# Patient Record
Sex: FEMALE | Race: ASIAN | NOT HISPANIC OR LATINO | Employment: PART TIME | URBAN - METROPOLITAN AREA
[De-identification: names, ages, dates, MRNs, and addresses within clinical notes are randomized per-mention and may not be internally consistent; named-entity substitution may affect disease eponyms.]

---

## 2021-05-11 ENCOUNTER — IMMUNIZATION (OUTPATIENT)
Dept: PEDIATRICS | Facility: CLINIC | Age: 31
End: 2021-05-11
Payer: COMMERCIAL

## 2021-05-11 PROCEDURE — 0001A PR COVID VAC PFIZER DIL RECON 30 MCG/0.3 ML IM: CPT

## 2021-05-11 PROCEDURE — 91300 PR COVID VAC PFIZER DIL RECON 30 MCG/0.3 ML IM: CPT

## 2022-01-19 ENCOUNTER — IMMUNIZATION (OUTPATIENT)
Dept: NURSING | Facility: CLINIC | Age: 32
End: 2022-01-19
Attending: INTERNAL MEDICINE
Payer: COMMERCIAL

## 2022-01-19 PROCEDURE — 91305 COVID-19,PF,PFIZER (12+ YRS): CPT

## 2022-01-19 PROCEDURE — 0052A COVID-19,PF,PFIZER (12+ YRS): CPT

## 2022-02-06 ENCOUNTER — HEALTH MAINTENANCE LETTER (OUTPATIENT)
Age: 32
End: 2022-02-06

## 2022-04-13 ENCOUNTER — OFFICE VISIT (OUTPATIENT)
Dept: MIDWIFE SERVICES | Facility: CLINIC | Age: 32
End: 2022-04-13
Payer: COMMERCIAL

## 2022-04-13 VITALS
HEIGHT: 61 IN | BODY MASS INDEX: 24.55 KG/M2 | SYSTOLIC BLOOD PRESSURE: 130 MMHG | HEART RATE: 97 BPM | DIASTOLIC BLOOD PRESSURE: 87 MMHG | WEIGHT: 130 LBS | TEMPERATURE: 96.9 F

## 2022-04-13 DIAGNOSIS — Z12.4 SCREENING FOR CERVICAL CANCER: ICD-10-CM

## 2022-04-13 DIAGNOSIS — Z01.419 ENCOUNTER FOR ANNUAL ROUTINE GYNECOLOGICAL EXAMINATION: Primary | ICD-10-CM

## 2022-04-13 DIAGNOSIS — N92.6 IRREGULAR PERIODS: ICD-10-CM

## 2022-04-13 DIAGNOSIS — N97.9 FEMALE INFERTILITY: ICD-10-CM

## 2022-04-13 DIAGNOSIS — R87.810 ASCUS WITH POSITIVE HIGH RISK HPV CERVICAL: ICD-10-CM

## 2022-04-13 DIAGNOSIS — Z12.4 SCREENING FOR MALIGNANT NEOPLASM OF CERVIX: ICD-10-CM

## 2022-04-13 DIAGNOSIS — R87.610 ASCUS WITH POSITIVE HIGH RISK HPV CERVICAL: ICD-10-CM

## 2022-04-13 LAB
ERYTHROCYTE [DISTWIDTH] IN BLOOD BY AUTOMATED COUNT: 12.1 % (ref 10–15)
HCT VFR BLD AUTO: 46.3 % (ref 35–47)
HGB BLD-MCNC: 15.9 G/DL (ref 11.7–15.7)
MCH RBC QN AUTO: 30.5 PG (ref 26.5–33)
MCHC RBC AUTO-ENTMCNC: 34.3 G/DL (ref 31.5–36.5)
MCV RBC AUTO: 89 FL (ref 78–100)
PLATELET # BLD AUTO: 216 10E3/UL (ref 150–450)
PROLACTIN SERPL-MCNC: 9 UG/L (ref 3–27)
RBC # BLD AUTO: 5.22 10E6/UL (ref 3.8–5.2)
WBC # BLD AUTO: 7.4 10E3/UL (ref 4–11)

## 2022-04-13 PROCEDURE — 88175 CYTOPATH C/V AUTO FLUID REDO: CPT | Performed by: ADVANCED PRACTICE MIDWIFE

## 2022-04-13 PROCEDURE — 85027 COMPLETE CBC AUTOMATED: CPT | Performed by: ADVANCED PRACTICE MIDWIFE

## 2022-04-13 PROCEDURE — 36415 COLL VENOUS BLD VENIPUNCTURE: CPT | Performed by: ADVANCED PRACTICE MIDWIFE

## 2022-04-13 PROCEDURE — 84443 ASSAY THYROID STIM HORMONE: CPT | Performed by: ADVANCED PRACTICE MIDWIFE

## 2022-04-13 PROCEDURE — 87624 HPV HI-RISK TYP POOLED RSLT: CPT | Performed by: ADVANCED PRACTICE MIDWIFE

## 2022-04-13 PROCEDURE — 84146 ASSAY OF PROLACTIN: CPT | Performed by: ADVANCED PRACTICE MIDWIFE

## 2022-04-13 PROCEDURE — 99385 PREV VISIT NEW AGE 18-39: CPT | Performed by: ADVANCED PRACTICE MIDWIFE

## 2022-04-13 PROCEDURE — 82306 VITAMIN D 25 HYDROXY: CPT | Performed by: ADVANCED PRACTICE MIDWIFE

## 2022-04-13 PROCEDURE — 88141 CYTOPATH C/V INTERPRET: CPT | Performed by: PATHOLOGY

## 2022-04-13 ASSESSMENT — PATIENT HEALTH QUESTIONNAIRE - PHQ9: SUM OF ALL RESPONSES TO PHQ QUESTIONS 1-9: 3

## 2022-04-13 NOTE — PROGRESS NOTES
Brando is a 31 year old . female who presents for annual exam.     Menses are irregular, just once or twice a year lasting 5 days.  Menses flow: spotty.  Patient's last menstrual period was 2022.. Using none for contraception.    Besides routine health maintenance,  she would like to discuss irregular periods and fertility.  Brando has been trying to conceive for three years. Over the last year she has tried using ovulation predictor kits. She recently had a positive result on OPK and then had a period about a week later. Otherwise she has periods just once or twice a year, which has been consistent since she started menstruating.   Brando has a history of abnormal Pap smear in 2016, ASCUS with HR HPV. She saw OBGYN for colposcopy and left during the appointment because she felt confused about her Pap result and why she needed a colpo. She did not feel like the provider informed her about what was recommended and she did not feel comfortable having the procedure done. She has not had another Pap since.  GYNECOLOGIC HISTORY:  Menarche: 14  Age at first intercourse: 18 Number of lifetime partners: 2  Brando is sexually active with 1male partner(s) and is currently in monogamous relationship .    History sexually transmitted infections:No STD history  STI testing offered? Declined  MIC exposure: No  History of abnormal Pap smear: Yes, 2016 ASCUS with HR HPV  Family history of breast CA: No  Family history of uterine/ovarian CA: No    Family history of colon CA: No    HEALTH MAINTENANCE:  Cholesterol: (No results found for: CHOL History of abnormal lipids: No  Mammo: no . History of abnormal Mammo: Not applicable.  Regular Self Breast Exams: No  Calcium/Vitamin D intake: source:  dairy, veggies Adequate? Yes  TSH: (No results found for: TSH )  Pap; (No results found for: PAP )    HISTORY:  OB History    Para Term  AB Living   0 0 0 0 0 0   SAB IAB Ectopic Multiple Live Births   0 0 0 0 0     History  "reviewed. No pertinent past medical history.  History reviewed. No pertinent surgical history.  Family History   Problem Relation Age of Onset     Lymphoma Mother      Hypertension Father      Diabetes Paternal Grandmother      Social History     Socioeconomic History     Marital status: Single     Spouse name: None     Number of children: None     Years of education: None     Highest education level: None     No current outpatient medications on file.   No Known Allergies    Past medical, surgical, social and family history were reviewed and updated in EPIC.    ROS:   C:     NEGATIVE for fever, chills, change in weight  I:       NEGATIVE for worrisome rashes, moles or lesions  E:     NEGATIVE for vision changes or irritation  E/M: NEGATIVE for ear, mouth and throat problems  R:     NEGATIVE for significant cough or SOB  CV:   NEGATIVE for chest pain, palpitations or peripheral edema  GI:     NEGATIVE for nausea, abdominal pain, heartburn, or change in bowel habits  :   NEGATIVE for frequency, dysuria, hematuria, vaginal discharge, or irregular bleeding  M:     NEGATIVE for significant arthralgias or myalgia  N:      NEGATIVE for weakness, dizziness or paresthesias  E:      NEGATIVE for temperature intolerance, skin/hair changes  P:      NEGATIVE for changes in mood or affect.    EXAM:  /87   Pulse 97   Temp 96.9  F (36.1  C)   Ht 1.549 m (5' 1\")   Wt 59 kg (130 lb)   LMP 03/30/2022   Breastfeeding No   BMI 24.56 kg/m     BMI: Body mass index is 24.56 kg/m .  Constitutional: healthy, alert and no distress  Head: Normocephalic. No masses, lesions, tenderness or abnormalities  Neck: Neck supple. Trachea midline. No adenopathy. Thyroid symmetric, normal size.   Cardiovascular: RRR.   Respiratory: Negative.   Breast: No nodularity, asymmetry or nipple discharge bilaterally.  Gastrointestinal: Abdomen soft, non-tender, non-distended. No masses, organomegaly.  :  Vulva:  No external lesions, normal female " hair distribution, no inguinal adenopathy.    Urethra:  Midline, non-tender, well supported, no discharge  Vagina:  Moist, pink, no abnormal discharge, no lesions, cervix is nulliparous, smooth, no lesions, negative CMT, Pap smear collected  Ovaries:  No masses appreciated, non-tender, mobile  Rectal Exam: deferred  Musculoskeletal: extremities normal  Skin: no suspicious lesions or rashes  Psychiatric: Affect appropriate, cooperative,mentation appears normal.     COUNSELING:    reports that she has never smoked. She has never used smokeless tobacco.  Body mass index is 24.56 kg/m .      ASSESSMENT:  31 year old female with satisfactory annual exam  (Z01.419) Encounter for annual routine gynecological examination  (primary encounter diagnosis)    (N92.6) Irregular periods  Comment: Discussed potential causes of irregular periods including PCOS, thyroid disorders, elevated prolactin, premature ovarian insufficiency. Plan for labs and transvaginal US.  Plan: CBC with platelets, TSH with free T4 reflex,         Prolactin, Vitamin D Deficiency, US         Transvaginal Pelvic Non-OB    (R87.610,  R87.810) ASCUS with positive high risk HPV cervical  Comment: Long discussion about cervical cancer screening including Pap smears, speculum exams, and colposcopy. Plan for diagnostic Pap today and then colpo if needed. Patient is fearful of colposcopy and interested in anti-anxiety medication to premedicate procedure.  Plan: Pap imaged thin layer screen with HPV -         recommended age 30 - 65 years (select HPV order        below)    (Z12.4) Screening for cervical cancer  Plan: Pap diagnostic with HPV    (N97.9) Female infertility  Comment: Discussed irregular periods and hypothesis that she is not ovulating regularly. Plan for workup discussed above and then referral to OBGYN for either further assessment or for fertility treatment.    Deneen Manzano, CHEN CONNORS

## 2022-04-14 LAB
DEPRECATED CALCIDIOL+CALCIFEROL SERPL-MC: 11 UG/L (ref 20–75)
TSH SERPL DL<=0.005 MIU/L-ACNC: 1.57 MU/L (ref 0.4–4)

## 2022-04-18 LAB
BKR LAB AP GYN ADEQUACY: ABNORMAL
BKR LAB AP GYN INTERPRETATION: ABNORMAL
BKR LAB AP HPV REFLEX: ABNORMAL
BKR LAB AP LMP: ABNORMAL
BKR LAB AP PREVIOUS ABNL DX: ABNORMAL
BKR LAB AP PREVIOUS ABNORMAL: ABNORMAL
PATH REPORT.COMMENTS IMP SPEC: ABNORMAL
PATH REPORT.COMMENTS IMP SPEC: ABNORMAL
PATH REPORT.RELEVANT HX SPEC: ABNORMAL

## 2022-04-19 LAB
HUMAN PAPILLOMA VIRUS 16 DNA: NEGATIVE
HUMAN PAPILLOMA VIRUS 18 DNA: NEGATIVE
HUMAN PAPILLOMA VIRUS FINAL DIAGNOSIS: NORMAL
HUMAN PAPILLOMA VIRUS OTHER HR: NEGATIVE

## 2022-04-20 ENCOUNTER — PATIENT OUTREACH (OUTPATIENT)
Dept: FAMILY MEDICINE | Facility: CLINIC | Age: 32
End: 2022-04-20
Payer: COMMERCIAL

## 2022-04-20 DIAGNOSIS — R87.810 ASCUS WITH POSITIVE HIGH RISK HPV CERVICAL: ICD-10-CM

## 2022-04-20 DIAGNOSIS — R87.610 ASCUS WITH POSITIVE HIGH RISK HPV CERVICAL: ICD-10-CM

## 2022-08-22 ENCOUNTER — TELEPHONE (OUTPATIENT)
Dept: ORTHOPEDICS | Facility: CLINIC | Age: 32
End: 2022-08-22

## 2022-10-02 ENCOUNTER — HEALTH MAINTENANCE LETTER (OUTPATIENT)
Age: 32
End: 2022-10-02

## 2022-10-25 NOTE — TELEPHONE ENCOUNTER
FYI to provider - Patient is lost to pap tracking follow-up. Attempts to contact pt have been made per reminder process and there has been no reply and/or no appt scheduled. Contact hx listed below.     7/29/15 ASCUS pap, + HR HPV. 2016, did not have colposcopy  4/13/22 ASCUS pap, neg HR HPV. Plan: colp bef 7/13/22 4/21/22 left msg and sent my chart result note. Seen by patient on 4/21/2022  9:53 PM  6/13/22 Reminder MyChart / read  7/11/22 [place] not done. Tracking updated for 6 mo colp/pap due 10/13/22.   9/26/22 Reminder MyChart / MyChart marked as read by the patient  10/25/22 Lost to follow-up for pap tracking

## 2023-05-04 ENCOUNTER — OFFICE VISIT (OUTPATIENT)
Dept: FAMILY MEDICINE | Facility: CLINIC | Age: 33
End: 2023-05-04
Payer: COMMERCIAL

## 2023-05-04 VITALS
WEIGHT: 135.4 LBS | TEMPERATURE: 97.4 F | HEART RATE: 79 BPM | SYSTOLIC BLOOD PRESSURE: 131 MMHG | OXYGEN SATURATION: 97 % | BODY MASS INDEX: 25.57 KG/M2 | DIASTOLIC BLOOD PRESSURE: 96 MMHG | HEIGHT: 61 IN

## 2023-05-04 DIAGNOSIS — R87.810 ASCUS WITH POSITIVE HIGH RISK HPV CERVICAL: ICD-10-CM

## 2023-05-04 DIAGNOSIS — Z11.59 NEED FOR HEPATITIS C SCREENING TEST: ICD-10-CM

## 2023-05-04 DIAGNOSIS — Z11.4 SCREENING FOR HIV (HUMAN IMMUNODEFICIENCY VIRUS): ICD-10-CM

## 2023-05-04 DIAGNOSIS — N92.6 IRREGULAR PERIODS: Primary | ICD-10-CM

## 2023-05-04 DIAGNOSIS — R87.610 ASCUS WITH POSITIVE HIGH RISK HPV CERVICAL: ICD-10-CM

## 2023-05-04 PROCEDURE — 99204 OFFICE O/P NEW MOD 45 MIN: CPT

## 2023-05-04 NOTE — PATIENT INSTRUCTIONS
Schedule lab visit - morning labs, do not eat after midnight the night before     Schedule pelvic ultrasound - [facility]    Schedule with Deneen RIVAS  - follow up on PAP from last year    Follow up with me in 3-6 months, recheck blood pressure.   Try low salt diet

## 2023-05-04 NOTE — PROGRESS NOTES
"  Assessment & Plan     Irregular periods  Chronic, unchanged. Unclear DDx, includes diabetes, PCOS, fibroids, endometriosis, polyps, premature menopause. Patient has not completed prior workup. Spent most of the visit providing education about cervical cancer, pap smear, and  Strongly encouraged patient to schedule pelvic US (order from 2022 midwife visit not completed) and schedule follow up with OB.   - US Pelvic Complete with Transvaginal  - Vitamin D Deficiency  - Basic metabolic panel  (Ca, Cl, CO2, Creat, Gluc, K, Na, BUN)  - CBC with platelets and differential  - TSH with free T4 reflex    ASCUS with positive high risk HPV cervical  Recurrent. Discussed  PAP and HPV result history and rational for cervical cancer screening protocols, strongly encouraged pt to see OBGYN. Advised they may want to repeat pap this year vs proceed with colposcpy.     Need for hepatitis C screening test  - Hepatitis C Screen Reflex to HCV RNA Quant and Genotype    Screening for HIV (human immunodeficiency virus)  - HIV Antigen Antibody Combo      Ordering of each unique test         BMI:   Estimated body mass index is 25.58 kg/m  as calculated from the following:    Height as of this encounter: 1.549 m (5' 1\").    Weight as of this encounter: 61.4 kg (135 lb 6.4 oz).       See Patient Instructions    CHEN Goodwin CNP  [facility]    Humberto Michaels is a 32 year old, presenting for the following health issues:  Abnormal Bleeding Problem (Discuss infertility )        5/4/2023    12:53 PM   Additional Questions   Roomed by Chika   Accompanied by none         5/4/2023    12:53 PM   Patient Reported Additional Medications   Patient reports taking the following new medications none     Abnormal Bleeding Problem    History of Present Illness       Reason for visit:  Infertility    She eats 2-3 servings of fruits and vegetables daily.She consumes 1 sweetened beverage(s) daily.She exercises with enough " "effort to increase her heart rate 30 to 60 minutes per day.  She exercises with enough effort to increase her heart rate 3 or less days per week.   She is taking medications regularly.     Last period in November 2022, lasted 3 weeks and heavier than normal.   Irregular periods since teen. Periods usually light, no severe cramping. No prior birth control. No prior pregnancies. No prior STI.     Trying to get pregnant since 2019, was tracking ovulation with strips, has had positive results every few months.        Wt Readings from Last 5 Encounters:   05/04/23 61.4 kg (135 lb 6.4 oz)   04/13/22 59 kg (130 lb)       Denies change in appetite/wt, denies constipation/diarrhea, skin/hair changes. Denies pain with intercourse, pelvic pain, fainting, dizziness, or fevers .    Saw OBGYN last year for same concerns.     Chart shows history of abnormal PAP, recommended colp and pt was lost to follow up. Repeated PAP in 2020 and again lost to follow up.     7/29/15 ASCUS pap, + HR HPV. 2016, did not have colposcopy    4/13/22 ASCUS pap, neg HR HPV. Plan: colp bef 7/13/22 4/21/22 left msg and sent my chart result note. Seen by patient on 4/21/2022  9:53 PM  6/13/22 Reminder MyChart / read  7/11/22 [place] not done. Tracking updated for 6 mo colp/pap due 10/13/22.   9/26/22 Reminder MyChart / MyChart marked as read by the patient  10/25/22 Lost to follow-up for pap tracking    Pt reports she was advised to schedule something but she did not schedule due to confusion and has not followed up.       Review of Systems   Genitourinary: Positive for menstrual problem.      Constitutional, HEENT, cardiovascular, pulmonary, gi and gu systems are negative, except as otherwise noted.      Objective    BP (!) 131/96 (BP Location: Right arm, Patient Position: Sitting, Cuff Size: Adult Regular)   Pulse 79   Temp 97.4  F (36.3  C) (Tympanic)   Ht 1.549 m (5' 1\")   Wt 61.4 kg (135 lb 6.4 oz)   LMP 11/09/2022 (Exact Date)   SpO2 97%   " Breastfeeding No   BMI 25.58 kg/m    Body mass index is 25.58 kg/m .  Physical Exam   GENERAL: healthy, alert and no distress  NECK: no adenopathy, no asymmetry, masses, or scars and thyroid normal to palpation  RESP: lungs clear to auscultation - no rales, rhonchi or wheezes  CV: regular rate and rhythm, normal S1 S2, no S3 or S4, no murmur, click or rub, no peripheral edema and peripheral pulses strong  ABDOMEN: soft, nontender, no hepatosplenomegaly, no masses and bowel sounds normal  SKIN: no suspicious lesions or rashes    No results found for this or any previous visit (from the past 24 hour(s)).

## 2023-05-20 ENCOUNTER — HEALTH MAINTENANCE LETTER (OUTPATIENT)
Age: 33
End: 2023-05-20

## 2023-06-22 ENCOUNTER — HOSPITAL ENCOUNTER (OUTPATIENT)
Dept: ULTRASOUND IMAGING | Facility: CLINIC | Age: 33
Discharge: HOME | End: 2023-06-22
Payer: COMMERCIAL

## 2023-06-22 ENCOUNTER — OFFICE VISIT (OUTPATIENT)
Dept: FAMILY MEDICINE | Facility: CLINIC | Age: 33
End: 2023-06-22
Payer: COMMERCIAL

## 2023-06-22 ENCOUNTER — TELEPHONE (OUTPATIENT)
Dept: FAMILY MEDICINE | Facility: CLINIC | Age: 33
End: 2023-06-22

## 2023-06-22 VITALS
DIASTOLIC BLOOD PRESSURE: 94 MMHG | TEMPERATURE: 97.3 F | RESPIRATION RATE: 16 BRPM | WEIGHT: 138 LBS | HEIGHT: 61 IN | OXYGEN SATURATION: 98 % | HEART RATE: 74 BPM | BODY MASS INDEX: 26.06 KG/M2 | SYSTOLIC BLOOD PRESSURE: 126 MMHG

## 2023-06-22 DIAGNOSIS — N92.6 IRREGULAR PERIODS: ICD-10-CM

## 2023-06-22 DIAGNOSIS — M72.2 PLANTAR FASCIITIS: ICD-10-CM

## 2023-06-22 DIAGNOSIS — L25.9 CONTACT DERMATITIS, UNSPECIFIED CONTACT DERMATITIS TYPE, UNSPECIFIED TRIGGER: Primary | ICD-10-CM

## 2023-06-22 LAB
ANION GAP SERPL CALCULATED.3IONS-SCNC: 7 MMOL/L (ref 7–15)
BASOPHILS # BLD AUTO: 0 10E3/UL (ref 0–0.2)
BASOPHILS NFR BLD AUTO: 0 %
BUN SERPL-MCNC: 13.2 MG/DL (ref 6–20)
CALCIUM SERPL-MCNC: 9.2 MG/DL (ref 8.6–10)
CHLORIDE SERPL-SCNC: 104 MMOL/L (ref 98–107)
CREAT SERPL-MCNC: 0.61 MG/DL (ref 0.51–0.95)
DEPRECATED CALCIDIOL+CALCIFEROL SERPL-MC: 13 UG/L (ref 20–75)
DEPRECATED HCO3 PLAS-SCNC: 25 MMOL/L (ref 22–29)
EOSINOPHIL # BLD AUTO: 0.1 10E3/UL (ref 0–0.7)
EOSINOPHIL NFR BLD AUTO: 1 %
ERYTHROCYTE [DISTWIDTH] IN BLOOD BY AUTOMATED COUNT: 11.5 % (ref 10–15)
GFR SERPL CREATININE-BSD FRML MDRD: >90 ML/MIN/1.73M2
GLUCOSE SERPL-MCNC: 103 MG/DL (ref 70–99)
HCT VFR BLD AUTO: 43.5 % (ref 35–47)
HCV AB SERPL QL IA: NONREACTIVE
HGB BLD-MCNC: 14.6 G/DL (ref 11.7–15.7)
HIV 1+2 AB+HIV1 P24 AG SERPL QL IA: NONREACTIVE
IMM GRANULOCYTES # BLD: 0 10E3/UL
IMM GRANULOCYTES NFR BLD: 0 %
LYMPHOCYTES # BLD AUTO: 2.9 10E3/UL (ref 0.8–5.3)
LYMPHOCYTES NFR BLD AUTO: 35 %
MCH RBC QN AUTO: 30 PG (ref 26.5–33)
MCHC RBC AUTO-ENTMCNC: 33.6 G/DL (ref 31.5–36.5)
MCV RBC AUTO: 89 FL (ref 78–100)
MONOCYTES # BLD AUTO: 0.4 10E3/UL (ref 0–1.3)
MONOCYTES NFR BLD AUTO: 4 %
NEUTROPHILS # BLD AUTO: 4.7 10E3/UL (ref 1.6–8.3)
NEUTROPHILS NFR BLD AUTO: 58 %
PLATELET # BLD AUTO: 204 10E3/UL (ref 150–450)
POTASSIUM SERPL-SCNC: 4.6 MMOL/L (ref 3.4–5.3)
RBC # BLD AUTO: 4.87 10E6/UL (ref 3.8–5.2)
SODIUM SERPL-SCNC: 136 MMOL/L (ref 136–145)
TSH SERPL DL<=0.005 MIU/L-ACNC: 1.96 UIU/ML (ref 0.3–4.2)
WBC # BLD AUTO: 8.1 10E3/UL (ref 4–11)

## 2023-06-22 PROCEDURE — 85025 COMPLETE CBC W/AUTO DIFF WBC: CPT | Performed by: FAMILY MEDICINE

## 2023-06-22 PROCEDURE — 99214 OFFICE O/P EST MOD 30 MIN: CPT | Performed by: FAMILY MEDICINE

## 2023-06-22 PROCEDURE — 84443 ASSAY THYROID STIM HORMONE: CPT | Performed by: FAMILY MEDICINE

## 2023-06-22 PROCEDURE — 80048 BASIC METABOLIC PNL TOTAL CA: CPT | Performed by: FAMILY MEDICINE

## 2023-06-22 PROCEDURE — 76830 TRANSVAGINAL US NON-OB: CPT

## 2023-06-22 PROCEDURE — 87389 HIV-1 AG W/HIV-1&-2 AB AG IA: CPT | Performed by: FAMILY MEDICINE

## 2023-06-22 PROCEDURE — 36415 COLL VENOUS BLD VENIPUNCTURE: CPT | Performed by: FAMILY MEDICINE

## 2023-06-22 PROCEDURE — 86803 HEPATITIS C AB TEST: CPT | Performed by: FAMILY MEDICINE

## 2023-06-22 PROCEDURE — 82306 VITAMIN D 25 HYDROXY: CPT | Performed by: FAMILY MEDICINE

## 2023-06-22 RX ORDER — KETOCONAZOLE 20 MG/G
CREAM TOPICAL DAILY
Qty: 45 G | Refills: 0 | Status: SHIPPED | OUTPATIENT
Start: 2023-06-22 | End: 2024-02-23

## 2023-06-22 RX ORDER — TRIAMCINOLONE ACETONIDE 1 MG/G
OINTMENT TOPICAL 2 TIMES DAILY
Qty: 45 G | Refills: 0 | Status: SHIPPED | OUTPATIENT
Start: 2023-06-22 | End: 2024-02-23

## 2023-06-22 ASSESSMENT — ENCOUNTER SYMPTOMS
ALLERGIC/IMMUNOLOGIC NEGATIVE: 1
CONSTITUTIONAL NEGATIVE: 1
EYES NEGATIVE: 1
CARDIOVASCULAR NEGATIVE: 1
PSYCHIATRIC NEGATIVE: 1
HEMATOLOGIC/LYMPHATIC NEGATIVE: 1
GASTROINTESTINAL NEGATIVE: 1
RESPIRATORY NEGATIVE: 1
ENDOCRINE NEGATIVE: 1

## 2023-06-22 ASSESSMENT — PAIN SCALES - GENERAL: PAINLEVEL: SEVERE PAIN (7)

## 2023-06-22 NOTE — TELEPHONE ENCOUNTER
Patient calling regarding visit today.     States Dr. Barfield was going to order custom foot insoles for plantar fascitis.    No order present in chart.     Please order/advise     [phone] okay to leave a detailed message.    Isha Muhammad RN on 6/22/2023 at 11:45 AM

## 2023-06-22 NOTE — PROGRESS NOTES
"  Assessment & Plan     (L25.9) Contact dermatitis, unspecified contact dermatitis type, unspecified trigger  (primary encounter diagnosis)  Comment: Rash appears yeats like. Recommend nizoral and steroid cream, if no improvement recommend derm appointment  Plan: triamcinolone (KENALOG) 0.1 % external         ointment, ketoconazole (NIZORAL) 2 % external         cream         (M72.2) Plantar fasciitis  Comment: DME order for orthotics, needs to make appointment.  Plan: [facility] Orthotic; Foot         Orthotics; Bilateral            (N92.6) Irregular periods  Comment: Patient has had this issue for a long time. Work up has not been completed. Encouraged that she complete her work up.  Plan: As above    Review of external notes as documented elsewhere in note  30 minutes spent by me on the date of the encounter doing chart review, interpretation of tests, patient visit and documentation        BMI:   Estimated body mass index is 26.51 kg/m  as calculated from the following:    Height as of this encounter: 1.537 m (5' 0.5\").    Weight as of this encounter: 62.6 kg (138 lb).       FUTURE APPOINTMENTS:       - Follow-up visit as needed.    Jazlyn Barfield MD  [facility]    Humberto Michaels is a 32 year old, presenting for the following health issues:    32 yr old female here for several complaints.     Rash  Patient comes in with a rash on both her nipples. Rash is scaly and itchy, she says it has been there for about a month. She did not change soaps or detergents.    Patient reports that she has had some clear discharge from the nipples. There is mild pain. No warmth. She is not nursing and she reports that she is not on any birth control.    Foot pain  Ongoing for months.pain is on the plantar surface. Pain on first step in the morning. 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 Job becoming more difficult.    Irregular periods  She says this has " been ongoing for years . It has no pattern or rhythm. Review of her chart shows she has been seen for this several times but has failed to complete work up including labs and ultrasound. Highly encouraged that she gets her labs and ultrasound done.       Breast Problem (Has had itchiness of both breasts for over 1 month.  She thought this may have been related to dry skin and has used lotion but her symptoms are still present.  Thought she may be pregnant and did home tests that were negative.  The last negative test was 3 weeks ago.  There is some breast pain.  Her LMP was in November as her cycle is irregular.), Foot Pain (Bilateral foot pain.  This has been for the past month.  She noticed after a run where the great toe area on the left side was swollen.  When standing she can notice the pain and can't stand for longer periods of time. ), Blood Draw (Patient is fasting today.  She has future orders from another Provider to complete.), and Imm/Inj (She will update her tetanus injection at a later time.)        6/22/2023     7:55 AM   Additional Questions   Roomed by Starr Perez CMA     Imm/Inj    History of Present Illness       Reason for visit:  Itchy breasts and foot pain  Symptom onset:  More than a month  Symptoms include:  Extreme itchiness and painful feet  Symptom intensity:  Moderate  Symptom progression:  Worsening  Had these symptoms before:  No  What makes it worse:  Running  What makes it better:  Sade consumes 1 sweetened beverage(s) daily.She exercises with enough effort to increase her heart rate 9 or less minutes per day.  She exercises with enough effort to increase her heart rate 3 or less days per week.   She is taking medications regularly.       Chief Complaint   Patient presents with     Breast Problem     Has had itchiness of both breasts for over 1 month.  She thought this may have been related to dry skin and has used lotion but her symptoms are still present.  Thought she may be  pregnant and did home tests that were negative.  The last negative test was 3 weeks ago.  There is some breast pain.  Her LMP was in November as her cycle is irregular.     Foot Pain     Bilateral foot pain.  This has been for the past month.  She noticed after a run where the great toe area on the left side was swollen.  When standing she can notice the pain and can't stand for longer periods of time.      Blood Draw     Patient is fasting today.  She has future orders from another Provider to complete.     Imm/Inj     She will update her tetanus injection at a later time.       Breast Concern  Onset/Duration: For over one month  Description:   Location: Around both breasts.  Pain or tenderness: YES  Redness:  no   Intensity: moderate, severe  Progression of Symptoms: worsening  Accompanying Signs & Symptoms:  Any lumps in axillary region:  no   Movable: N/A  Nipple discharge: yes, her bra will have discharge.  Changes in the skin or nipple: May have been a pimple.  On Hormone therapy:  no   Does it change with menstrual cycle:  no   Previous history of similar problem: None  First degree relative with breast cancer: a negative family history for breast cancer.  Precipitating factors:           Worsened by: Since it has been hotter outside.  Alleviating factors:            Improved by: None  Therapies tried and outcome: None  Patient's last menstrual period was 11/09/2022 (exact date).          Review of Systems   Constitutional: Negative.    HENT: Negative.    Eyes: Negative.    Respiratory: Negative.    Cardiovascular: Negative.    Gastrointestinal: Negative.    Endocrine: Negative.    Breasts:  Positive for tenderness and discharge.   Genitourinary: Positive for menstrual problem.   Musculoskeletal: Positive for gait problem.        Jesus foot pain   Allergic/Immunologic: Negative.    Hematological: Negative.    Psychiatric/Behavioral: Negative.             Objective    BP (!) 126/94   Pulse 74   Temp 97.3  F  "(36.3  C) (Tympanic)   Resp 16   Ht 1.537 m (5' 0.5\")   Wt 62.6 kg (138 lb)   LMP 11/09/2022 (Exact Date)   SpO2 98%   BMI 26.51 kg/m    Body mass index is 26.51 kg/m .  Physical Exam  Constitutional:       Appearance: Normal appearance.   HENT:      Head: Normocephalic and atraumatic.   Cardiovascular:      Rate and Rhythm: Normal rate and regular rhythm.      Pulses: Normal pulses.      Heart sounds: Normal heart sounds.   Pulmonary:      Effort: Pulmonary effort is normal.      Breath sounds: Normal breath sounds.   Chest:   Breasts:     Right: Nipple discharge and skin change present. No swelling.      Left: Nipple discharge and skin change present.      Comments: Crusty scaly rash on both nipples. Appears like a yeasty fungal rash.  Abdominal:      General: Abdomen is flat. Bowel sounds are normal.      Palpations: Abdomen is soft.   Skin:     Findings: Erythema and rash present.   Neurological:      Mental Status: She is alert and oriented to person, place, and time.   Psychiatric:         Mood and Affect: Mood normal.         Behavior: Behavior normal.            Results for orders placed or performed in visit on 06/22/23 (from the past 24 hour(s))   TSH with free T4 reflex   Result Value Ref Range    TSH 1.96 0.30 - 4.20 uIU/mL   CBC with platelets and differential    Narrative    The following orders were created for panel order CBC with platelets and differential.  Procedure                               Abnormality         Status                     ---------                               -----------         ------                     CBC with platelets and d...[[id number]]                      Final result                 Please view results for these tests on the individual orders.   Basic metabolic panel  (Ca, Cl, CO2, Creat, Gluc, K, Na, BUN)   Result Value Ref Range    Sodium 136 136 - 145 mmol/L    Potassium 4.6 3.4 - 5.3 mmol/L    Chloride 104 98 - 107 mmol/L    Carbon Dioxide (CO2) 25 22 - 29 " mmol/L    Anion Gap 7 7 - 15 mmol/L    Urea Nitrogen 13.2 6.0 - 20.0 mg/dL    Creatinine 0.61 0.51 - 0.95 mg/dL    Calcium 9.2 8.6 - 10.0 mg/dL    Glucose 103 (H) 70 - 99 mg/dL    GFR Estimate >90 >60 mL/min/1.73m2   Vitamin D Deficiency   Result Value Ref Range    Vitamin D, Total (25-Hydroxy) 13 (L) 20 - 75 ug/L    Narrative    Season, race, dietary intake, and treatment affect the concentration of 25-hydroxy-Vitamin D. Values may decrease during winter months and increase during summer months. Values 20-29 ug/L may indicate Vitamin D insufficiency and values <20 ug/L may indicate Vitamin D deficiency.    Vitamin D determination is routinely performed by an immunoassay specific for 25 hydroxyvitamin D3.  If an individual is on vitamin D2(ergocalciferol) supplementation, please specify 25 OH vitamin D2 and D3 level determination by LCMSMS test VITD23.     Hepatitis C Screen Reflex to HCV RNA Quant and Genotype   Result Value Ref Range    Hepatitis C Antibody Nonreactive Nonreactive    Narrative    Assay performance characteristics have not been established for newborns, infants, and children.   HIV Antigen Antibody Combo   Result Value Ref Range    HIV Antigen Antibody Combo Nonreactive Nonreactive   CBC with platelets and differential   Result Value Ref Range    WBC Count 8.1 4.0 - 11.0 10e3/uL    RBC Count 4.87 3.80 - 5.20 10e6/uL    Hemoglobin 14.6 11.7 - 15.7 g/dL    Hematocrit 43.5 35.0 - 47.0 %    MCV 89 78 - 100 fL    MCH 30.0 26.5 - 33.0 pg    MCHC 33.6 31.5 - 36.5 g/dL    RDW 11.5 10.0 - 15.0 %    Platelet Count 204 150 - 450 10e3/uL    % Neutrophils 58 %    % Lymphocytes 35 %    % Monocytes 4 %    % Eosinophils 1 %    % Basophils 0 %    % Immature Granulocytes 0 %    Absolute Neutrophils 4.7 1.6 - 8.3 10e3/uL    Absolute Lymphocytes 2.9 0.8 - 5.3 10e3/uL    Absolute Monocytes 0.4 0.0 - 1.3 10e3/uL    Absolute Eosinophils 0.1 0.0 - 0.7 10e3/uL    Absolute Basophils 0.0 0.0 - 0.2 10e3/uL    Absolute  Immature Granulocytes 0.0 <=0.4 10e3/uL

## 2023-06-23 NOTE — TELEPHONE ENCOUNTER
DME order placed during visit, patient needs to call to schedule an appt with orthotics department.

## 2023-06-26 ENCOUNTER — OFFICE VISIT (OUTPATIENT)
Dept: MIDWIFE SERVICES | Facility: CLINIC | Age: 33
End: 2023-06-26
Payer: COMMERCIAL

## 2023-06-26 VITALS
OXYGEN SATURATION: 100 % | DIASTOLIC BLOOD PRESSURE: 94 MMHG | WEIGHT: 138 LBS | HEART RATE: 97 BPM | BODY MASS INDEX: 26.51 KG/M2 | SYSTOLIC BLOOD PRESSURE: 129 MMHG

## 2023-06-26 DIAGNOSIS — N97.9 FEMALE INFERTILITY: Primary | ICD-10-CM

## 2023-06-26 DIAGNOSIS — E55.9 VITAMIN D DEFICIENCY: ICD-10-CM

## 2023-06-26 PROCEDURE — 99214 OFFICE O/P EST MOD 30 MIN: CPT | Performed by: ADVANCED PRACTICE MIDWIFE

## 2023-06-26 RX ORDER — CHOLECALCIFEROL (VITAMIN D3) 50 MCG
1 TABLET ORAL DAILY
Qty: 90 TABLET | Refills: 3 | Status: SHIPPED | OUTPATIENT
Start: 2023-06-26 | End: 2024-02-23

## 2023-06-26 NOTE — PROGRESS NOTES
S:  Brando is here with her  Sha to discuss trying to conceive. Brando first established care with CN in April 2022 for annual exam and began fertility workup at that time. She has been trying to conceive for 4 years. She has periods just one or twice a year. Periods last 5 days and have moderate bleeding and cramping.    In April of last year, lab work was started to rule out PCOS, thyroid disorders, prolactin abnormalities. She was found to have low vitamin D but other labs were normal. Plan was made with patient to get TVUS and then return to clinic for other fertility labs. Brando had a gap in care and did not return to clinic, as she hoped that her period would regulate on it's own.     She recently had TVUS ordered by a different provider and completed this week. US shows endometrial hyperplasia, Nabothian cyst on cervix and small uterine fibroid.     In the past year, Brando has only had one period, in November 2022.    O:  BP (!) 129/94   Pulse 97   Wt 62.6 kg (138 lb)   LMP 11/09/2022 (Exact Date)   SpO2 100%   BMI 26.51 kg/m       Transvaginal ultrasound 6/22/23:  UTERUS: 7.5 x 4.3 x 3.1 cm. Likely intramural fibroid in the posterior  aspect of the uterine fundus measuring up to 1.5 cm. Nabothian cyst in  the cervix.     ENDOMETRIUM: 14 mm. Thickened, heterogeneous endometrium with some  subtle internal cystic change.     RIGHT OVARY: 2.9 x 2.6 x 1.9 cm. Normal with flow demonstrated.     LEFT OVARY: 3.5 x 2.4 x 1.7 cm. Normal with flow demonstrated.    A/P:  (N97.9) Female infertility  (primary encounter diagnosis)  Comment: Plan for AMH today and then estradiol and FSH on day 3 of next period. Establish care with OBGYN for additional assessment, including assessment of endometrial hyperplasia on ultrasound. Suspect hyperplasia is due to infrequent menses, but could assess further with endometrial biopsy, per OBGYN discretion. Discussed I can also refer her directly to local fertility clinic, but  she would like to see OBGYN on our team first.  Plan: Anti-Mullerian hormone, Estradiol, Follicle         stimulating hormone    (E55.9) Vitamin D deficiency  Comment: Discussed vitamin D deficiency and recommendation for supplement.  Plan: vitamin D3 (CHOLECALCIFEROL) 50 mcg (2000         units) tablet      CHEN Chávez CNM

## 2023-06-27 NOTE — TELEPHONE ENCOUNTER
Left detailed VM for patient (states okay below) and provided number for podiatry.    Noy Caceres RN  [facility]

## 2023-06-28 ENCOUNTER — LAB (OUTPATIENT)
Dept: LAB | Facility: CLINIC | Age: 33
End: 2023-06-28
Payer: COMMERCIAL

## 2023-06-28 DIAGNOSIS — N97.9 FEMALE INFERTILITY: ICD-10-CM

## 2023-06-28 LAB
FSH SERPL IRP2-ACNC: 6 MIU/ML
MIS SERPL-MCNC: 7.26 NG/ML (ref 0.58–8.1)

## 2023-06-28 PROCEDURE — 83520 IMMUNOASSAY QUANT NOS NONAB: CPT

## 2023-06-28 PROCEDURE — 36415 COLL VENOUS BLD VENIPUNCTURE: CPT

## 2023-06-28 PROCEDURE — 83001 ASSAY OF GONADOTROPIN (FSH): CPT

## 2023-06-29 ENCOUNTER — LAB (OUTPATIENT)
Dept: LAB | Facility: CLINIC | Age: 33
End: 2023-06-29
Payer: COMMERCIAL

## 2023-06-29 DIAGNOSIS — N97.9 FEMALE INFERTILITY: ICD-10-CM

## 2023-06-29 LAB — ESTRADIOL SERPL-MCNC: 22 PG/ML

## 2023-06-29 PROCEDURE — 36415 COLL VENOUS BLD VENIPUNCTURE: CPT

## 2023-06-29 PROCEDURE — 82670 ASSAY OF TOTAL ESTRADIOL: CPT

## 2023-07-07 ENCOUNTER — OFFICE VISIT (OUTPATIENT)
Dept: OBGYN | Facility: CLINIC | Age: 33
End: 2023-07-07
Payer: COMMERCIAL

## 2023-07-07 VITALS
SYSTOLIC BLOOD PRESSURE: 123 MMHG | TEMPERATURE: 97.9 F | HEART RATE: 85 BPM | DIASTOLIC BLOOD PRESSURE: 87 MMHG | BODY MASS INDEX: 26.16 KG/M2 | WEIGHT: 136.2 LBS

## 2023-07-07 DIAGNOSIS — R87.810 ASCUS WITH POSITIVE HIGH RISK HPV CERVICAL: ICD-10-CM

## 2023-07-07 DIAGNOSIS — E28.2 PCOS (POLYCYSTIC OVARIAN SYNDROME): ICD-10-CM

## 2023-07-07 DIAGNOSIS — R87.610 ASCUS WITH POSITIVE HIGH RISK HPV CERVICAL: ICD-10-CM

## 2023-07-07 DIAGNOSIS — R93.89 THICKENED ENDOMETRIUM: Primary | ICD-10-CM

## 2023-07-07 PROCEDURE — 99204 OFFICE O/P NEW MOD 45 MIN: CPT | Performed by: OBSTETRICS & GYNECOLOGY

## 2023-07-07 NOTE — PATIENT INSTRUCTIONS
"Instructions for Letrozole    1. Have a period  - Take a pregnancy test, and report results to Dr. Terrazas. She will then order letrozole.  - Provera for 10 days (if you are not having periods on your own)  - You will get a period after the provera  - Your first of bleeding (real bleeding, not just spotting) is \"Day One\"  - on the first cycle we do an xray of the fallopian tubes (HSG). Call on day one to schedule: [phone]. It will be done cycle days 7-10.    2. Ovulate  - Take letrozole on days 3-7 (day 1 is first day of bleeding)    3. Check for ovulation  - Start day 10 for 10-14 days. Ovulation predictor kits helps us know when in cycle you ovulate. Inform Dr. Terrazas of when you had positive OPK.  - Come in to the lab for a blood draw on day 21 (progesterone), or when indicated by Dr. Terrazas based on OPK result.. If elevated, this confirms ovulation.    4. [place]  - Start day 11  - EVERY OTHER DAY to ensure a high concentration of sperm. DAILY for 2-3 days when ovulation kit is positive.    5. Check for pregnancy  - If you ovulated, you'll either get a period, or you'll be pregnant.  - If you don't get a period, take a pregnancy test two weeks after your progesterone level (day 35)    Send me a [employer] message or call with any questions.      "

## 2023-07-07 NOTE — PROGRESS NOTES
Assessment & Plan     Thickened endometrium  Reviewed ultrasound report and images.  Follicles do appear to be small and peripheral on right ovary.  Discussed markedly thickened endometrium.  Ultrasound was notably done a few days prior to her recent period.   Discussed that prolonged anovulation can lead to thickened endometrium which may be a sign of endometrial hyperplasia.  Discussed hyperplasia as a potentially precancerous lesion.  Recommend evaluation with endometrial biopsy.  Recommend performing biopsy today but patient request to reschedule for a different day.      ASCUS with positive high risk HPV cervical  Discussed Pap smear history.  Due for Pap smear.  Recommend performing at time of endometrial biopsy at her earliest convenience.    PCOS (polycystic ovarian syndrome)  Discussed PCOS.  Meets criteria by oligo ovulation and ultrasound appearance of ovaries.  Anti-müllerian hormone was mildly elevated.  Testosterone has not been assessed.  - Testosterone Free and Total  - Discussed concerns related to PCOS   1. Metabolic syndrome  Abdominal obesity, defined as a waist circumference ?88 cm (35 in) in women. Not present  Serum triglycerides ?150 mg/dL (1.7 mmol/L) or drug treatment for elevated triglycerides not assessed  Serum high-density lipoprotein (HDL) cholesterol <50 mg/dL (1.3 mmol/L) in women not assessed  Blood pressure ?130/85 mmHg or drug treatment for elevated blood pressure not present  Fasting plasma glucose (FPG) ?100 mg/dL (5.6 mmol/L) or drug treatment for elevated blood glucose    2. Cosmetic   - facial hair, no complaints   - acne, not present   - hair loss over crown, not present   - obesity not present     3. Endometrial cancer    - discussed risk if chronic anovulation    - needs period at least every 3 months, plan ovulation induction after endometrial biopsy.     4. Fertility  - discussed recommendation for ovulation induction  - Discussed ovulation induction with clomid versus  "letrozole  - Discussed lower risk of multiples based on preliminary data with letrozole  - Discussed off label use of letrozole, versus FDA approval for clomid. Discussed some patients with PCOS have improved fertility rates with letrozole, others with clomid. Discussed anti-estrogenic effect of clomid. Given thin endometrial stripe on prior ultrasound, letrozole may be better option  - Discussed side effects of letrozole, primarily headache, dizziness, fatigue.   - Reviewed written letrozole instructions, will communicate by MyChart.  -  - plan semen analysis and plan HSG with first cycle.  - Will start ovulation induction cycle with spontaneous menses or provera after endometrial biopsy  - Taking PNV.            Review of the result(s) of each unique test - labs, ultrasound  Ordering of each unique test         BMI:   Estimated body mass index is 26.16 kg/m  as calculated from the following:    Height as of 6/22/23: 1.537 m (5' 0.5\").    Weight as of this encounter: 61.8 kg (136 lb 3.2 oz).   Weight management plan: Discussed healthy diet and exercise guidelines        No follow-ups on file.    Chana Terrazas MD  [facility]    Humberto Michaels is a 33 year old, presenting for the following health issues:  Follow Up (Fertility/EMB.)    HPI   Presents for evaluation of infertility  Patient's last menstrual period was 06/26/2023.   Normal period. Lasted 7 days.   Periods prior to this have been 1-2 times per year.   Has been trying to conceive for four years.   Here with her . No proven paternity.     Past Medical History:   Diagnosis Date     PCOS (polycystic ovarian syndrome)        No past surgical history on file.    Family History   Problem Relation Age of Onset     Lymphoma Mother      Hypertension Father      Diabetes Paternal Grandmother        Social History     Socioeconomic History     Marital status: Single     Spouse name: Not on file     Number of " children: Not on file     Years of education: Not on file     Highest education level: Not on file   Occupational History     Not on file   Tobacco Use     Smoking status: Never     Passive exposure: Never     Smokeless tobacco: Never   Vaping Use     Vaping Use: Never used   Substance and Sexual Activity     Alcohol use: Yes     Drug use: Never     Sexual activity: Yes     Partners: Male     Birth control/protection: None   Other Topics Concern     Not on file   Social History Narrative     Not on file     Social Determinants of Health     Financial Resource Strain: Not on file   Food Insecurity: Not on file   Transportation Needs: Not on file   Physical Activity: Not on file   Stress: Not on file   Social Connections: Not on file   Intimate Partner Violence: Not on file   Housing Stability: Not on file       Current Outpatient Medications   Medication     ketoconazole (NIZORAL) 2 % external cream     triamcinolone (KENALOG) 0.1 % external ointment     vitamin D3 (CHOLECALCIFEROL) 50 mcg (2000 units) tablet     No current facility-administered medications for this visit.        No Known Allergies        Review of Systems   Constitutional, HEENT, cardiovascular, pulmonary, gi and gu systems are negative, except as otherwise noted.      Objective    /87   Pulse 85   Temp 97.9  F (36.6  C) (Temporal)   Wt 61.8 kg (136 lb 3.2 oz)   LMP 06/26/2023   Breastfeeding No   BMI 26.16 kg/m    Body mass index is 26.16 kg/m .  Physical Exam   GENERAL: healthy, alert and no distress  MS: no gross musculoskeletal defects noted, no edema  PSYCH: mentation appears normal, affect normal/bright      Component      Latest Ref Rng 6/22/2023  9:15 AM 6/28/2023  7:38 AM   TSH      0.30 - 4.20 uIU/mL 1.96     Vitamin D Deficiency screening      20 - 75 ug/L 13 (L)     Hepatitis C Antibody      Nonreactive  Nonreactive     HIV Antigen Antibody Combo      Nonreactive  Nonreactive     Anti-Mullerian Hormone      0.580 - 8.100  ng/mL  7.260    FSH      mIU/mL  6.0    Estradiol      pg/mL       Component      Latest Ref Rng 6/29/2023  9:43 AM   TSH      0.30 - 4.20 uIU/mL    Vitamin D Deficiency screening      20 - 75 ug/L    Hepatitis C Antibody      Nonreactive     HIV Antigen Antibody Combo      Nonreactive     Anti-Mullerian Hormone      0.580 - 8.100 ng/mL    FSH      mIU/mL    Estradiol      pg/mL 22       Legend:  (L) Low    Component      Latest Ref Rng 6/22/2023  9:15 AM   Sodium      136 - 145 mmol/L 136    Potassium      3.4 - 5.3 mmol/L 4.6    Chloride      98 - 107 mmol/L 104    Carbon Dioxide (CO2)      22 - 29 mmol/L 25    Anion Gap      7 - 15 mmol/L 7    Urea Nitrogen      6.0 - 20.0 mg/dL 13.2    Creatinine      0.51 - 0.95 mg/dL 0.61    Calcium      8.6 - 10.0 mg/dL 9.2    Glucose      70 - 99 mg/dL 103 (H)    GFR Estimate      >60 mL/min/1.73m2 >90       Legend:  (H) High      US PELVIC TRANSABDOMINAL AND TRANSVAGINAL 6/22/2023 4:06 PM     CLINICAL HISTORY: Irregular periods  TECHNIQUE: Transabdominal scans were performed. Endovaginal ultrasound  was performed to better visualize the adnexa.     COMPARISON: None.     FINDINGS:     UTERUS: 7.5 x 4.3 x 3.1 cm. Likely intramural fibroid in the posterior  aspect of the uterine fundus measuring up to 1.5 cm. Nabothian cyst in  the cervix.     ENDOMETRIUM: 14 mm. Thickened, heterogeneous endometrium with some  subtle internal cystic change.     RIGHT OVARY: 2.9 x 2.6 x 1.9 cm. Normal with flow demonstrated.     LEFT OVARY: 3.5 x 2.4 x 1.7 cm. Normal with flow demonstrated.     No significant free fluid.                                                                      IMPRESSION:  1.  Thickened, heterogeneous endometrium, of uncertain clinical  significance in this premenopausal patient, differential includes  hyperplasia. Could consider sonographic follow-up or sampling.  2.  Likely small intramural uterine fibroid.     ERI TRACY MD

## 2023-08-16 ENCOUNTER — TELEPHONE (OUTPATIENT)
Dept: FAMILY MEDICINE | Facility: CLINIC | Age: 33
End: 2023-08-16
Payer: COMMERCIAL

## 2023-08-16 DIAGNOSIS — E55.9 VITAMIN D DEFICIENCY: Primary | ICD-10-CM

## 2023-08-16 RX ORDER — ERGOCALCIFEROL 1.25 MG/1
50000 CAPSULE, LIQUID FILLED ORAL WEEKLY
Qty: 8 CAPSULE | Refills: 0 | Status: SHIPPED | OUTPATIENT
Start: 2023-08-16 | End: 2023-10-05

## 2023-08-16 NOTE — TELEPHONE ENCOUNTER
Called pt. ANJEL 8/16/23 at 10:50.     Called to check in on patients plan for follow up regarding her prior pelvic ultrasound findings.   Would like to encourage her to schedule the endometrial biopsy recommended by Dr. Terrazas.     She does not need an appointment with me just wanted to see if there was anything else I could help with or questions I can answer.    Wanted to let pt know I think she's in great hands getting procedure done with Dr. Terrazas. I do think it's important to get this completed as it can rule out some serious conditions and/or help up come with recommendations to treat her symptoms.     Thanks

## 2023-10-20 ENCOUNTER — TELEPHONE (OUTPATIENT)
Dept: OBGYN | Facility: CLINIC | Age: 33
End: 2023-10-20
Payer: COMMERCIAL

## 2023-10-20 DIAGNOSIS — N93.8 DUB (DYSFUNCTIONAL UTERINE BLEEDING): Primary | ICD-10-CM

## 2023-10-20 NOTE — LETTER
October 24, 2023      Brando Antony  [address] [address]        Dear Brando,  We have been trying to reach you by phone to get you scheduled for a couple appointments. At this time, it is recommended that you get a repeat ultrasound and plan to have a follow up visit after with me to discuss. Please call the clinic at [phone] to get these appointments scheduled at your earliest convenience.           Sincerely,    Chana Terrazas MD

## 2023-10-20 NOTE — TELEPHONE ENCOUNTER
"LVM for patient to call back.    Held US time on 10/23 for 4:00 pm US  Held 11:15 slot on AO schedule for follow up phone visit.  Offer these to patient - if doesn't work for patient then we can unhold these slots.    EVANGELISTA Adams Amanda Elizabeth, MD Martinetto, Cora S, RN  Whatever the patient is willing to do. If ultrasound with phone visit is best that's fine.  On call day is fine    She can go on an est prenatal spot that's fine. She could be double booked with a 30 min on a not too busy day.    Whatever works!      Vidya Bolanos RN Olson, Amanda Elizabeth, MD; [facility]  Hi Dr. Terrazas,  Actually to clarify, did you want an US with a follow up phone visit to discuss then plan to schedule an EMB later?  That makes more sense now that I think about it... Help my Friday brain :)    Thanks!          Previous Messages       ----- Message -----  From: Vidya Bolanos RN  Sent: 10/20/2023  11:29 AM CDT  To: Chana Terrazas MD; [facility] Triage    Hi!  We are happy to reach out to this patient. Just was taking a peek at the schedule and noticed that you do not have any clinic time available until 12/1/23 and that would be over an \"Established Prenatal\" slot. Did you have a specific spot where you would like her in you schedule, and on call day, or does early December work time nicholas?    Thanks!  Vidya  ----- Message -----  From: Chana Terrazas MD  Sent: 10/20/2023  10:21 AM CDT  To: [facility]    Please call patient.  She needs repeat ultrasound and possibly endometrial biopsy.  She has been nervous to schedule.    See if we can help her schedule ultrasound and appointment. We can discuss biopsy then.    Answer questions and see if she wants to talk to me.    Thank you!  ----- Message -----  From: Chana Terrzaas MD  Sent: 10/19/2023  12:00 AM CDT  To: Chana Terrazas MD    Check on US, biopsy   "

## 2023-10-23 NOTE — TELEPHONE ENCOUNTER
Second attempt at reaching patient.  LVM that we would like to get her scheduled for a couple appointments and to please call back.  Unheld appt times.    Vidya Bolanos RN

## 2023-10-24 NOTE — TELEPHONE ENCOUNTER
Third attempt at reaching patient.  LVM for patient to call back.  Letter sent to patient home address on file regarding scheduling information.    Vidya Bolanos RN

## 2024-02-09 ENCOUNTER — ANCILLARY PROCEDURE (OUTPATIENT)
Dept: ULTRASOUND IMAGING | Facility: CLINIC | Age: 34
End: 2024-02-09
Attending: OBSTETRICS & GYNECOLOGY
Payer: COMMERCIAL

## 2024-02-09 DIAGNOSIS — N93.8 DUB (DYSFUNCTIONAL UTERINE BLEEDING): ICD-10-CM

## 2024-02-09 PROCEDURE — 76856 US EXAM PELVIC COMPLETE: CPT | Performed by: OBSTETRICS & GYNECOLOGY

## 2024-02-09 PROCEDURE — 76830 TRANSVAGINAL US NON-OB: CPT | Performed by: OBSTETRICS & GYNECOLOGY

## 2024-02-19 ENCOUNTER — TELEPHONE (OUTPATIENT)
Dept: OBGYN | Facility: CLINIC | Age: 34
End: 2024-02-19
Payer: COMMERCIAL

## 2024-02-19 PROBLEM — R93.89 THICKENED ENDOMETRIUM: Status: ACTIVE | Noted: 2024-02-10

## 2024-02-19 NOTE — TELEPHONE ENCOUNTER
Type of surgery: gyn  Location of surgery: [facility]  Date and time of surgery: 03/19/2024 9:15AM  Surgeon: Dr. Chana Terrazas  Pre-Op Appt Date: 02/23/2024  Post-Op Appt Date: 04/15/2024   Packet sent out: Yes  Pre-cert/Authorization completed:  Not Applicable  Date: 02/19/2024

## 2024-02-23 ENCOUNTER — OFFICE VISIT (OUTPATIENT)
Dept: FAMILY MEDICINE | Facility: CLINIC | Age: 34
End: 2024-02-23
Payer: COMMERCIAL

## 2024-02-23 VITALS
TEMPERATURE: 98 F | OXYGEN SATURATION: 97 % | BODY MASS INDEX: 25.72 KG/M2 | DIASTOLIC BLOOD PRESSURE: 87 MMHG | RESPIRATION RATE: 22 BRPM | SYSTOLIC BLOOD PRESSURE: 123 MMHG | HEART RATE: 68 BPM | WEIGHT: 131 LBS | HEIGHT: 60 IN

## 2024-02-23 DIAGNOSIS — R93.89 THICKENED ENDOMETRIUM: ICD-10-CM

## 2024-02-23 DIAGNOSIS — Z01.818 PREOP GENERAL PHYSICAL EXAM: Primary | ICD-10-CM

## 2024-02-23 DIAGNOSIS — E28.2 PCOS (POLYCYSTIC OVARIAN SYNDROME): ICD-10-CM

## 2024-02-23 DIAGNOSIS — J01.10 ACUTE NON-RECURRENT FRONTAL SINUSITIS: ICD-10-CM

## 2024-02-23 DIAGNOSIS — R05.2 SUBACUTE COUGH: ICD-10-CM

## 2024-02-23 DIAGNOSIS — E55.9 VITAMIN D DEFICIENCY: ICD-10-CM

## 2024-02-23 PROCEDURE — 99214 OFFICE O/P EST MOD 30 MIN: CPT | Performed by: FAMILY MEDICINE

## 2024-02-23 RX ORDER — BENZONATATE 200 MG/1
200 CAPSULE ORAL 3 TIMES DAILY PRN
Qty: 21 CAPSULE | Refills: 0 | Status: SHIPPED | OUTPATIENT
Start: 2024-02-23 | End: 2024-03-01

## 2024-02-23 RX ORDER — CHOLECALCIFEROL (VITAMIN D3) 50 MCG
1 TABLET ORAL DAILY
Qty: 90 TABLET | Refills: 3 | Status: SHIPPED | OUTPATIENT
Start: 2024-02-23

## 2024-02-23 RX ORDER — AZITHROMYCIN 250 MG/1
TABLET, FILM COATED ORAL
Qty: 6 TABLET | Refills: 0 | Status: SHIPPED | OUTPATIENT
Start: 2024-02-23 | End: 2024-02-28

## 2024-02-23 ASSESSMENT — PAIN SCALES - GENERAL: PAINLEVEL: NO PAIN (0)

## 2024-02-23 NOTE — PROGRESS NOTES
Preoperative Evaluation  [address] [address]  Phone: [phone]  Primary Provider: No Ref-Primary, Physician  Pre-op Performing Provider: NUZHAT DIMAS  Feb 23, 2024       Brando is a 33 year old, presenting for the following:  Pre-Op Exam        2/23/2024     9:33 AM   Additional Questions   Roomed by Sofía KRAMER CMA   Accompanied by N/A         2/23/2024     9:33 AM   Patient Reported Additional Medications   Patient reports taking the following new medications None     Surgical Information  Surgery/Procedure: MORCELLATION, HYSTEROSCOPIC   Surgery Location: [facility]  Surgeon: Dr. Terrazas  Surgery Date: 03/19/24  Time of Surgery: 9:20  Where patient plans to recover: At home with family  Fax number for surgical facility: Note does not need to be faxed, will be available electronically in Epic.    Assessment & Plan     The proposed surgical procedure is considered INTERMEDIATE risk.    Preop general physical exam  Nothing to eat or drink,  after midnight the morning of surgery.  No NSAID one week before surgery    PCOS (polycystic ovarian syndrome)  Thickened endometrium  Surgery,  Morcellation, Hysteroscopic, PAP    Vitamin D deficiency    - vitamin D3 (CHOLECALCIFEROL) 50 mcg (2000 units) tablet; Take 1 tablet (50 mcg) by mouth daily    Acute non-recurrent frontal sinusitis    - azithromycin (ZITHROMAX) 250 MG tablet; Take 2 tablets (500 mg) by mouth daily for 1 day, THEN 1 tablet (250 mg) daily for 4 days.    Subacute cough    - benzonatate (TESSALON) 200 MG capsule; Take 1 capsule (200 mg) by mouth 3 times daily as needed for cough       - No identified additional risk factors other than previously addressed    Antiplatelet or Anticoagulation Medication Instructions   - Patient is on no antiplatelet or anticoagulation medications.    Additional Medication Instructions  Patient is on no additional chronic  medications    Recommendation  APPROVAL GIVEN to proceed with proposed procedure, without further diagnostic evaluation.    Subjective       HPI related to upcoming procedure:     Patient with history of polycystic ovarian syndrome, thickened endometrium.  She is scheduled to have a biopsy.  She has no heart disease, no history of asthma, no history of bleeding or blood transfusion.    She has been having minor cough, postnasal drainage.  She has no fever no chills, no short of breath.        2/23/2024     9:21 AM   Preop Questions   1. Have you ever had a heart attack or stroke? No   2. Have you ever had surgery on your heart or blood vessels, such as a stent placement, a coronary artery bypass, or surgery on an artery in your head, neck, heart, or legs? No   3. Do you have chest pain with activity? No   4. Do you have a history of  heart failure? No   5. Do you currently have a cold, bronchitis or symptoms of other infection? No   6. Do you have a cough, shortness of breath, or wheezing? No   7. Do you or anyone in your family have previous history of blood clots? No   8. Do you or does anyone in your family have a serious bleeding problem such as prolonged bleeding following surgeries or cuts? No   9. Have you ever had problems with anemia or been told to take iron pills? No   10. Have you had any abnormal blood loss such as black, tarry or bloody stools, or abnormal vaginal bleeding? No   11. Have you ever had a blood transfusion? No   12. Are you willing to have a blood transfusion if it is medically needed before, during, or after your surgery? NO -    13. Have you or any of your relatives ever had problems with anesthesia? No   14. Do you have sleep apnea, excessive snoring or daytime drowsiness? No   15. Do you have any artifical heart valves or other implanted medical devices like a pacemaker, defibrillator, or continuous glucose monitor? No   16. Do you have artificial joints? No   17. Are you allergic to  latex? No   18. Is there any chance that you may be pregnant? No       Health Care Directive  Patient does not have a Health Care Directive or Living Will:     Preoperative Review of    reviewed - no record of controlled substances prescribed.      Status of Chronic Conditions:  See problem list for active medical problems.  Problems all longstanding and stable, except as noted/documented.  See ROS for pertinent symptoms related to these conditions.    Patient Active Problem List    Diagnosis Date Noted    Thickened endometrium 02/10/2024     Priority: Medium    Vitamin D deficiency 08/16/2023     Priority: Medium    ASCUS with positive high risk HPV cervical 04/13/2022     Priority: Medium     7/29/15 ASCUS pap, + HR HPV. 2016, did not have colposcopy  4/13/22 ASCUS pap, neg HR HPV. Plan: colp bef 7/13/22 4/21/22 left msg and sent my chart result note. Seen by patient on 4/21/2022  9:53 PM  6/13/22 Reminder MyChart / read  7/11/22 [place] not done. Tracking updated for 6 mo colp/pap due 10/13/22.   9/26/22 Reminder MyChart / MyChart marked as read by the patient  10/25/22 Lost to follow-up for pap tracking          Past Medical History:   Diagnosis Date    PCOS (polycystic ovarian syndrome)      History reviewed. No pertinent surgical history.  Current Outpatient Medications   Medication Sig Dispense Refill    ketoconazole (NIZORAL) 2 % external cream Apply topically daily (Patient not taking: Reported on 2/23/2024) 45 g 0    triamcinolone (KENALOG) 0.1 % external ointment Apply topically 2 times daily (Patient not taking: Reported on 2/23/2024) 45 g 0    vitamin D3 (CHOLECALCIFEROL) 50 mcg (2000 units) tablet Take 1 tablet (50 mcg) by mouth daily (Patient not taking: Reported on 2/23/2024) 90 tablet 3       No Known Allergies     Social History     Tobacco Use    Smoking status: Never     Passive exposure: Never    Smokeless tobacco: Never   Substance Use Topics    Alcohol use: Yes     Family History   Problem  "Relation Age of Onset    Lymphoma Mother     Hypertension Father     Diabetes Paternal Grandmother      History   Drug Use Unknown         Review of Systems    Review of Systems  Constitutional, HEENT, cardiovascular, pulmonary, GI, , musculoskeletal, neuro, skin, endocrine and psych systems are negative, except as otherwise noted.    Objective    /87 (BP Location: Right arm, Patient Position: Chair, Cuff Size: Adult Regular)   Pulse 68   Temp 98  F (36.7  C) (Oral)   Resp 22   Ht 1.533 m (5' 0.34\")   Wt 59.4 kg (131 lb)   LMP 07/30/2023   SpO2 97%   BMI 25.30 kg/m     Estimated body mass index is 25.3 kg/m  as calculated from the following:    Height as of this encounter: 1.533 m (5' 0.34\").    Weight as of this encounter: 59.4 kg (131 lb).  Physical Exam  GENERAL: alert and no distress  EYES: Eyes grossly normal to inspection, PERRL and conjunctivae and sclerae normal  HENT: ear canals and TM's normal, nose and mouth without ulcers or lesions  NECK: no adenopathy, no asymmetry, masses, or scars  RESP: lungs clear to auscultation - no rales, rhonchi or wheezes  CV: regular rate and rhythm, normal S1 S2, no S3 or S4, no murmur, click or rub, no peripheral edema  ABDOMEN: soft, nontender, no hepatosplenomegaly, no masses and bowel sounds normal  MS: no gross musculoskeletal defects noted, no edema  SKIN: no suspicious lesions or rashes  NEURO: Normal strength and tone, mentation intact and speech normal  PSYCH: mentation appears normal, affect normal/bright    Recent Labs   Lab Test 06/22/23  0915 04/13/22  1430   HGB 14.6 15.9*    216     --    POTASSIUM 4.6  --    CR 0.61  --         Diagnostics  No labs were ordered during this visit.   No EKG required for low risk surgery (cataract, skin procedure, breast biopsy, etc).    Revised Cardiac Risk Index (RCRI)  The patient has the following serious cardiovascular risks for perioperative complications:   - No serious cardiac risks = 0 " points     RCRI Interpretation: 0 points: Class I (very low risk - 0.4% complication rate)         Signed Electronically by: Marisa Blackman MD  Copy of this evaluation report is provided to requesting physician.

## 2024-02-23 NOTE — PATIENT INSTRUCTIONS
Preparing for Your Surgery  Getting started  A nurse will call you to review your health history and instructions. They will give you an arrival time based on your scheduled surgery time. Please be ready to share:  Your doctor's clinic name and phone number  Your medical, surgical, and anesthesia history  A list of allergies and sensitivities  A list of medicines, including herbal treatments and over-the-counter drugs  Whether the patient has a legal guardian (ask how to send us the papers in advance)  Please tell us if you're pregnant--or if there's any chance you might be pregnant. Some surgeries may injure a fetus (unborn baby), so they require a pregnancy test. Surgeries that are safe for a fetus don't always need a test, and you can choose whether to have one.   If you have a child who's having surgery, please ask for a copy of Preparing for Your Child's Surgery.    Preparing for surgery  Within 10 to 30 days of surgery: Have a pre-op exam (sometimes called an H&P, or History and Physical). This can be done at a clinic or pre-operative center.  If you're having a , you may not need this exam. Talk to your care team.  At your pre-op exam, talk to your care team about all medicines you take. If you need to stop any medicines before surgery, ask when to start taking them again.  We do this for your safety. Many medicines can make you bleed too much during surgery. Some change how well surgery (anesthesia) drugs work.  Call your insurance company to let them know you're having surgery. (If you don't have insurance, call [phone].)  Call your clinic if there's any change in your health. This includes signs of a cold or flu (sore throat, runny nose, cough, rash, fever). It also includes a scrape or scratch near the surgery site.  If you have questions on the day of surgery, call your hospital or surgery center.  Eating and drinking guidelines  For your safety: Unless your surgeon tells you otherwise,  follow the guidelines below.  Eat and drink as usual until 8 hours before you arrive for surgery. After that, no food or milk.  Drink clear liquids until 2 hours before you arrive. These are liquids you can see through, like water, Gatorade, and Propel Water. They also include plain black coffee and tea (no cream or milk), candy, and breath mints. You can spit out gum when you arrive.  If you drink alcohol: Stop drinking it the night before surgery.  If your care team tells you to take medicine on the morning of surgery, it's okay to take it with a sip of water.  Preventing infection  Shower or bathe the night before and morning of your surgery. Follow the instructions your clinic gave you. (If no instructions, use regular soap.)  Don't shave or clip hair near your surgery site. We'll remove the hair if needed.  Don't smoke or vape the morning of surgery. You may chew nicotine gum up to 2 hours before surgery. A nicotine patch is okay.  Note: Some surgeries require you to completely quit smoking and nicotine. Check with your surgeon.  Your care team will make every effort to keep you safe from infection. We will:  Clean our hands often with soap and water (or an alcohol-based hand rub).  Clean the skin at your surgery site with a special soap that kills germs.  Give you a special gown to keep you warm. (Cold raises the risk of infection.)  Wear special hair covers, masks, gowns and gloves during surgery.  Give antibiotic medicine, if prescribed. Not all surgeries need antibiotics.  What to bring on the day of surgery  Photo ID and insurance card  Copy of your health care directive, if you have one  Glasses and hearing aids (bring cases)  You can't wear contacts during surgery  Inhaler and eye drops, if you use them (tell us about these when you arrive)  CPAP machine or breathing device, if you use them  A few personal items, if spending the night  If you have . . .  A pacemaker, ICD (cardiac defibrillator) or other  implant: Bring the ID card.  An implanted stimulator: Bring the remote control.  A legal guardian: Bring a copy of the certified (court-stamped) guardianship papers.  Please remove any jewelry, including body piercings. Leave jewelry and other valuables at home.  If you're going home the day of surgery  You must have a responsible adult drive you home. They should stay with you overnight as well.  If you don't have someone to stay with you, and you aren't safe to go home alone, we may keep you overnight. Insurance often won't pay for this.  After surgery  If it's hard to control your pain or you need more pain medicine, please call your surgeon's office.  Questions?   If you have any questions for your care team, list them here: _________________________________________________________________________________________________________________________________________________________________________ ____________________________________ ____________________________________ ____________________________________  For informational purposes only. Not to replace the advice of your health care provider. Copyright   2003, 2019 [place] [employer] [facility]. All rights reserved. Clinically reviewed by Laurel No MD. [employer]ART[employer] 461610 - REV 12/22.    How to Take Your Medication Before Surgery  - Take all of your medications before surgery except as noted below

## 2024-03-12 DIAGNOSIS — Z01.818 PRE-OP EXAM: Primary | ICD-10-CM

## 2024-03-12 DIAGNOSIS — R93.89 THICKENED ENDOMETRIUM: ICD-10-CM

## 2024-03-17 LAB
ABO/RH(D): NORMAL
ANTIBODY SCREEN: NEGATIVE
SPECIMEN EXPIRATION DATE: NORMAL

## 2024-03-18 ENCOUNTER — ANESTHESIA EVENT (OUTPATIENT)
Dept: SURGERY | Facility: AMBULATORY SURGERY CENTER | Age: 34
End: 2024-03-18
Payer: MEDICAID

## 2024-03-18 ENCOUNTER — LAB (OUTPATIENT)
Dept: LAB | Facility: CLINIC | Age: 34
End: 2024-03-18
Payer: MEDICAID

## 2024-03-18 DIAGNOSIS — Z01.818 PRE-OP EXAM: ICD-10-CM

## 2024-03-18 DIAGNOSIS — N93.8 DUB (DYSFUNCTIONAL UTERINE BLEEDING): ICD-10-CM

## 2024-03-18 DIAGNOSIS — R93.89 THICKENED ENDOMETRIUM: ICD-10-CM

## 2024-03-18 DIAGNOSIS — Z01.818 PRE-OP EXAM: Primary | ICD-10-CM

## 2024-03-18 LAB
ERYTHROCYTE [DISTWIDTH] IN BLOOD BY AUTOMATED COUNT: 11.5 % (ref 10–15)
HCT VFR BLD AUTO: 46.2 % (ref 35–47)
HGB BLD-MCNC: 15.5 G/DL (ref 11.7–15.7)
MCH RBC QN AUTO: 29.8 PG (ref 26.5–33)
MCHC RBC AUTO-ENTMCNC: 33.5 G/DL (ref 31.5–36.5)
MCV RBC AUTO: 89 FL (ref 78–100)
PLATELET # BLD AUTO: 200 10E3/UL (ref 150–450)
RBC # BLD AUTO: 5.2 10E6/UL (ref 3.8–5.2)
SARS-COV-2 RNA RESP QL NAA+PROBE: NEGATIVE
WBC # BLD AUTO: 9.3 10E3/UL (ref 4–11)

## 2024-03-18 PROCEDURE — 86901 BLOOD TYPING SEROLOGIC RH(D): CPT

## 2024-03-18 PROCEDURE — 87635 SARS-COV-2 COVID-19 AMP PRB: CPT

## 2024-03-18 PROCEDURE — 85027 COMPLETE CBC AUTOMATED: CPT

## 2024-03-18 PROCEDURE — 86900 BLOOD TYPING SEROLOGIC ABO: CPT

## 2024-03-18 PROCEDURE — 36415 COLL VENOUS BLD VENIPUNCTURE: CPT

## 2024-03-18 PROCEDURE — 86850 RBC ANTIBODY SCREEN: CPT

## 2024-03-19 ENCOUNTER — ANESTHESIA (OUTPATIENT)
Dept: SURGERY | Facility: AMBULATORY SURGERY CENTER | Age: 34
End: 2024-03-19
Payer: MEDICAID

## 2024-03-19 ENCOUNTER — HOSPITAL ENCOUNTER (OUTPATIENT)
Facility: AMBULATORY SURGERY CENTER | Age: 34
Discharge: HOME | End: 2024-03-19
Attending: OBSTETRICS & GYNECOLOGY
Payer: MEDICAID

## 2024-03-19 VITALS
SYSTOLIC BLOOD PRESSURE: 134 MMHG | HEART RATE: 95 BPM | RESPIRATION RATE: 18 BRPM | WEIGHT: 135 LBS | DIASTOLIC BLOOD PRESSURE: 100 MMHG | BODY MASS INDEX: 25.49 KG/M2 | TEMPERATURE: 97 F | HEIGHT: 61 IN | OXYGEN SATURATION: 98 %

## 2024-03-19 DIAGNOSIS — R93.89 THICKENED ENDOMETRIUM: Primary | ICD-10-CM

## 2024-03-19 LAB
HCG UR QL: NEGATIVE
INTERNAL QC OK POCT: NORMAL
POCT KIT EXPIRATION DATE: NORMAL
POCT KIT LOT NUMBER: NORMAL

## 2024-03-19 PROCEDURE — 88305 TISSUE EXAM BY PATHOLOGIST: CPT | Mod: TC | Performed by: OBSTETRICS & GYNECOLOGY

## 2024-03-19 PROCEDURE — 58558 HYSTEROSCOPY BIOPSY: CPT

## 2024-03-19 PROCEDURE — G0145 SCR C/V CYTO,THINLAYER,RESCR: HCPCS | Performed by: OBSTETRICS & GYNECOLOGY

## 2024-03-19 PROCEDURE — 88305 TISSUE EXAM BY PATHOLOGIST: CPT | Mod: 26 | Performed by: PATHOLOGY

## 2024-03-19 PROCEDURE — 58558 HYSTEROSCOPY BIOPSY: CPT | Performed by: OBSTETRICS & GYNECOLOGY

## 2024-03-19 PROCEDURE — 99000 SPECIMEN HANDLING OFFICE-LAB: CPT | Performed by: PATHOLOGY

## 2024-03-19 PROCEDURE — 81025 URINE PREGNANCY TEST: CPT | Performed by: PATHOLOGY

## 2024-03-19 PROCEDURE — 87624 HPV HI-RISK TYP POOLED RSLT: CPT | Performed by: OBSTETRICS & GYNECOLOGY

## 2024-03-19 RX ORDER — LIDOCAINE 40 MG/G
CREAM TOPICAL
Status: DISCONTINUED | OUTPATIENT
Start: 2024-03-19 | End: 2024-03-19 | Stop reason: HOSPADM

## 2024-03-19 RX ORDER — LIDOCAINE HYDROCHLORIDE 20 MG/ML
INJECTION, SOLUTION INFILTRATION; PERINEURAL PRN
Status: DISCONTINUED | OUTPATIENT
Start: 2024-03-19 | End: 2024-03-19

## 2024-03-19 RX ORDER — ONDANSETRON 4 MG/1
4 TABLET, ORALLY DISINTEGRATING ORAL EVERY 30 MIN PRN
Status: DISCONTINUED | OUTPATIENT
Start: 2024-03-19 | End: 2024-03-20 | Stop reason: HOSPADM

## 2024-03-19 RX ORDER — HYDROMORPHONE HYDROCHLORIDE 1 MG/ML
0.2 INJECTION, SOLUTION INTRAMUSCULAR; INTRAVENOUS; SUBCUTANEOUS EVERY 5 MIN PRN
Status: DISCONTINUED | OUTPATIENT
Start: 2024-03-19 | End: 2024-03-20 | Stop reason: HOSPADM

## 2024-03-19 RX ORDER — FENTANYL CITRATE 50 UG/ML
25 INJECTION, SOLUTION INTRAMUSCULAR; INTRAVENOUS EVERY 5 MIN PRN
Status: DISCONTINUED | OUTPATIENT
Start: 2024-03-19 | End: 2024-03-20 | Stop reason: HOSPADM

## 2024-03-19 RX ORDER — IBUPROFEN 800 MG/1
800 TABLET, FILM COATED ORAL EVERY 6 HOURS PRN
Qty: 90 TABLET | Refills: 0 | Status: SHIPPED | OUTPATIENT
Start: 2024-03-19 | End: 2024-04-15

## 2024-03-19 RX ORDER — ACETAMINOPHEN 325 MG/1
975 TABLET ORAL ONCE
Status: COMPLETED | OUTPATIENT
Start: 2024-03-19 | End: 2024-03-19

## 2024-03-19 RX ORDER — HYDROMORPHONE HYDROCHLORIDE 1 MG/ML
0.4 INJECTION, SOLUTION INTRAMUSCULAR; INTRAVENOUS; SUBCUTANEOUS EVERY 5 MIN PRN
Status: DISCONTINUED | OUTPATIENT
Start: 2024-03-19 | End: 2024-03-20 | Stop reason: HOSPADM

## 2024-03-19 RX ORDER — IBUPROFEN 800 MG/1
800 TABLET, FILM COATED ORAL EVERY 6 HOURS PRN
COMMUNITY
Start: 2024-03-19 | End: 2024-03-19

## 2024-03-19 RX ORDER — FENTANYL CITRATE 50 UG/ML
50 INJECTION, SOLUTION INTRAMUSCULAR; INTRAVENOUS EVERY 5 MIN PRN
Status: DISCONTINUED | OUTPATIENT
Start: 2024-03-19 | End: 2024-03-20 | Stop reason: HOSPADM

## 2024-03-19 RX ORDER — ACETAMINOPHEN 325 MG/1
975 TABLET ORAL EVERY 6 HOURS PRN
Qty: 90 TABLET | Refills: 0 | Status: SHIPPED | OUTPATIENT
Start: 2024-03-19 | End: 2024-04-15

## 2024-03-19 RX ORDER — ONDANSETRON 2 MG/ML
4 INJECTION INTRAMUSCULAR; INTRAVENOUS EVERY 30 MIN PRN
Status: DISCONTINUED | OUTPATIENT
Start: 2024-03-19 | End: 2024-03-20 | Stop reason: HOSPADM

## 2024-03-19 RX ORDER — SODIUM CHLORIDE, SODIUM LACTATE, POTASSIUM CHLORIDE, CALCIUM CHLORIDE 600; 310; 30; 20 MG/100ML; MG/100ML; MG/100ML; MG/100ML
INJECTION, SOLUTION INTRAVENOUS CONTINUOUS PRN
Status: DISCONTINUED | OUTPATIENT
Start: 2024-03-19 | End: 2024-03-19

## 2024-03-19 RX ORDER — ACETAMINOPHEN 325 MG/1
975 TABLET ORAL ONCE
Status: DISCONTINUED | OUTPATIENT
Start: 2024-03-19 | End: 2024-03-20 | Stop reason: HOSPADM

## 2024-03-19 RX ORDER — SODIUM CHLORIDE, SODIUM LACTATE, POTASSIUM CHLORIDE, CALCIUM CHLORIDE 600; 310; 30; 20 MG/100ML; MG/100ML; MG/100ML; MG/100ML
INJECTION, SOLUTION INTRAVENOUS CONTINUOUS
Status: DISCONTINUED | OUTPATIENT
Start: 2024-03-19 | End: 2024-03-20 | Stop reason: HOSPADM

## 2024-03-19 RX ORDER — NALOXONE HYDROCHLORIDE 0.4 MG/ML
0.1 INJECTION, SOLUTION INTRAMUSCULAR; INTRAVENOUS; SUBCUTANEOUS
Status: DISCONTINUED | OUTPATIENT
Start: 2024-03-19 | End: 2024-03-20 | Stop reason: HOSPADM

## 2024-03-19 RX ORDER — FENTANYL CITRATE 50 UG/ML
INJECTION, SOLUTION INTRAMUSCULAR; INTRAVENOUS PRN
Status: DISCONTINUED | OUTPATIENT
Start: 2024-03-19 | End: 2024-03-19

## 2024-03-19 RX ORDER — OXYCODONE HYDROCHLORIDE 5 MG/1
5 TABLET ORAL
Status: COMPLETED | OUTPATIENT
Start: 2024-03-19 | End: 2024-03-19

## 2024-03-19 RX ORDER — IBUPROFEN 200 MG
800 TABLET ORAL ONCE
Status: DISCONTINUED | OUTPATIENT
Start: 2024-03-19 | End: 2024-03-20 | Stop reason: HOSPADM

## 2024-03-19 RX ORDER — PROPOFOL 10 MG/ML
INJECTION, EMULSION INTRAVENOUS CONTINUOUS PRN
Status: DISCONTINUED | OUTPATIENT
Start: 2024-03-19 | End: 2024-03-19

## 2024-03-19 RX ORDER — PROPOFOL 10 MG/ML
INJECTION, EMULSION INTRAVENOUS PRN
Status: DISCONTINUED | OUTPATIENT
Start: 2024-03-19 | End: 2024-03-19

## 2024-03-19 RX ORDER — OXYCODONE HYDROCHLORIDE 5 MG/1
10 TABLET ORAL
Status: DISCONTINUED | OUTPATIENT
Start: 2024-03-19 | End: 2024-03-20 | Stop reason: HOSPADM

## 2024-03-19 RX ORDER — ACETAMINOPHEN 325 MG/1
975 TABLET ORAL EVERY 6 HOURS PRN
COMMUNITY
Start: 2024-03-19 | End: 2024-03-19

## 2024-03-19 RX ORDER — ONDANSETRON 2 MG/ML
INJECTION INTRAMUSCULAR; INTRAVENOUS PRN
Status: DISCONTINUED | OUTPATIENT
Start: 2024-03-19 | End: 2024-03-19

## 2024-03-19 RX ORDER — SODIUM CHLORIDE, SODIUM LACTATE, POTASSIUM CHLORIDE, CALCIUM CHLORIDE 600; 310; 30; 20 MG/100ML; MG/100ML; MG/100ML; MG/100ML
INJECTION, SOLUTION INTRAVENOUS CONTINUOUS
Status: DISCONTINUED | OUTPATIENT
Start: 2024-03-19 | End: 2024-03-19 | Stop reason: HOSPADM

## 2024-03-19 RX ORDER — KETOROLAC TROMETHAMINE 30 MG/ML
INJECTION, SOLUTION INTRAMUSCULAR; INTRAVENOUS PRN
Status: DISCONTINUED | OUTPATIENT
Start: 2024-03-19 | End: 2024-03-19

## 2024-03-19 RX ADMIN — FENTANYL CITRATE 25 MCG: 50 INJECTION, SOLUTION INTRAMUSCULAR; INTRAVENOUS at 09:49

## 2024-03-19 RX ADMIN — SODIUM CHLORIDE, SODIUM LACTATE, POTASSIUM CHLORIDE, CALCIUM CHLORIDE: 600; 310; 30; 20 INJECTION, SOLUTION INTRAVENOUS at 09:32

## 2024-03-19 RX ADMIN — LIDOCAINE HYDROCHLORIDE 80 MG: 20 INJECTION, SOLUTION INFILTRATION; PERINEURAL at 09:35

## 2024-03-19 RX ADMIN — PROPOFOL 200 MCG/KG/MIN: 10 INJECTION, EMULSION INTRAVENOUS at 09:38

## 2024-03-19 RX ADMIN — KETOROLAC TROMETHAMINE 15 MG: 30 INJECTION, SOLUTION INTRAMUSCULAR; INTRAVENOUS at 10:16

## 2024-03-19 RX ADMIN — ONDANSETRON 4 MG: 2 INJECTION INTRAMUSCULAR; INTRAVENOUS at 09:56

## 2024-03-19 RX ADMIN — SODIUM CHLORIDE, SODIUM LACTATE, POTASSIUM CHLORIDE, CALCIUM CHLORIDE: 600; 310; 30; 20 INJECTION, SOLUTION INTRAVENOUS at 08:24

## 2024-03-19 RX ADMIN — ACETAMINOPHEN 975 MG: 325 TABLET ORAL at 08:11

## 2024-03-19 RX ADMIN — PROPOFOL 20 MG: 10 INJECTION, EMULSION INTRAVENOUS at 09:43

## 2024-03-19 RX ADMIN — OXYCODONE HYDROCHLORIDE 5 MG: 5 TABLET ORAL at 10:58

## 2024-03-19 RX ADMIN — FENTANYL CITRATE 25 MCG: 50 INJECTION, SOLUTION INTRAMUSCULAR; INTRAVENOUS at 09:41

## 2024-03-19 NOTE — OP NOTE
[facility] Brief Operative Note    Pre-operative diagnosis: Thickened endometrium [R93.89]   Post-operative diagnosis * No post-op diagnosis entered *   Procedure: Procedure(s):  MORCELLATION, HYSTEROSCOPIC; PAP   Surgeon: Chana Terrazas MD   Assistants(s): Fish Monroe DO   Estimated blood loss: Less than 10 ml    Specimens: Pap smear, endometrial polyps   Findings: Multiple apparent endometrial polyps in endometrial cavity.      Procedure:  PARQ was held and consent signed. The patient was taken to the operating room, where brief was held.  MAC was initiated and patient was positioned in dorsal lithotomy in what was felt to be a neurologically safe position. An exam under anesthesia was performed. A speculum was placed and pap smear obtained. Speculum was removed. She was prepped and draped in sterile fashion.  A team pause was held.  An open armed speculum was placed. A single-toothed tenaculum was placed on the cervix. A paracervical block was performed with 10 ml1% lidocaine without epinephrine.  The cervix was easily serially dilated to 21 [place]. The operative hysteroscope was primed and inserted with hydrodilation of the cervix. The above findings were noted. The Myosure was then inserted through the operating channel of the hysteroscope. The polyps were removed under direct visualization. The uterine cavity was smooth and hemostatic. The hysteroscope and speculum were removed.    The patient tolerated the procedure well. Closing counts were correct.  No antibiotics were indicated.    Chana Terrazas MD

## 2024-03-19 NOTE — ANESTHESIA PREPROCEDURE EVALUATION
Anesthesia Pre-Procedure Evaluation    Patient: Brando Antony   MRN: [id number] : 1990        Procedure : Procedure(s):  MORCELLATION, HYSTEROSCOPIC; PAP          Past Medical History:   Diagnosis Date    PCOS (polycystic ovarian syndrome)       Past Surgical History:   Procedure Laterality Date    WISDOM TOOTH EXTRACTION        No Known Allergies   Social History     Tobacco Use    Smoking status: Never     Passive exposure: Never    Smokeless tobacco: Never   Substance Use Topics    Alcohol use: Yes     Comment: occasional      Wt Readings from Last 1 Encounters:   24 61.2 kg (135 lb)        Anesthesia Evaluation   Pt has not had prior anesthetic         ROS/MED HX  ENT/Pulmonary:  - neg pulmonary ROS     Neurologic:  - neg neurologic ROS     Cardiovascular:  - neg cardiovascular ROS     METS/Exercise Tolerance:     Hematologic:  - neg hematologic  ROS     Musculoskeletal:  - neg musculoskeletal ROS     GI/Hepatic:  - neg GI/hepatic ROS     Renal/Genitourinary:  - neg Renal ROS     Endo:  - neg endo ROS     Psychiatric/Substance Use:  - neg psychiatric ROS     Infectious Disease:  - neg infectious disease ROS     Malignancy:       Other:            Physical Exam    Airway        Mallampati: III   TM distance: > 3 FB   Neck ROM: full   Mouth opening: < 3 cm    Respiratory Devices and Support         Dental       (+) Minor Abnormalities - some fillings, tiny chips      Cardiovascular   cardiovascular exam normal          Pulmonary   pulmonary exam normal                OUTSIDE LABS:  CBC:   Lab Results   Component Value Date    WBC 9.3 2024    WBC 8.1 2023    HGB 15.5 2024    HGB 14.6 2023    HCT 46.2 2024    HCT 43.5 2023     2024     2023     BMP:   Lab Results   Component Value Date     2023    POTASSIUM 4.6 2023    CHLORIDE 104 2023    CO2 25 2023    BUN 13.2 2023    CR 0.61 2023     (H)  "06/22/2023     COAGS: No results found for: \"PTT\", \"INR\", \"FIBR\"  POC:   Lab Results   Component Value Date    HCG Negative 03/19/2024     HEPATIC: No results found for: \"ALBUMIN\", \"PROTTOTAL\", \"ALT\", \"AST\", \"GGT\", \"ALKPHOS\", \"BILITOTAL\", \"BILIDIRECT\", \"CHARLIE\"  OTHER:   Lab Results   Component Value Date    HERIBERTO 9.2 06/22/2023    TSH 1.96 06/22/2023       Anesthesia Plan    ASA Status:  1       Anesthesia Type: MAC.     - Reason for MAC: immobility needed   Induction: Intravenous.   Maintenance: Balanced.        Consents    Anesthesia Plan(s) and associated risks, benefits, and realistic alternatives discussed. Questions answered and patient/representative(s) expressed understanding.     - Discussed: Risks, Benefits and Alternatives for BOTH SEDATION and the PROCEDURE were discussed     - Discussed with:  Patient      - Extended Intubation/Ventilatory Support Discussed: No.      - Patient is DNR/DNI Status: No     Use of blood products discussed: No .     Postoperative Care    Pain management: IV analgesics, Oral pain medications, Multi-modal analgesia.   PONV prophylaxis: Ondansetron (or other 5HT-3), Background Propofol Infusion     Comments:               Mario Payton MD    I have reviewed the pertinent notes and labs in the chart from the past 30 days and (re)examined the patient.  Any updates or changes from those notes are reflected in this note.              # Overweight: Estimated body mass index is 25.51 kg/m  as calculated from the following:    Height as of this encounter: 1.549 m (5' 1\").    Weight as of this encounter: 61.2 kg (135 lb).      "

## 2024-03-19 NOTE — ANESTHESIA POSTPROCEDURE EVALUATION
Patient: Brando Antony    Procedure: Procedure(s):  MORCELLATION, HYSTEROSCOPIC; PAP       Anesthesia Type:  MAC    Note:  Disposition: Outpatient   Postop Pain Control: Uneventful            Sign Out: Well controlled pain   PONV: No   Neuro/Psych: Uneventful            Sign Out: Acceptable/Baseline neuro status   Airway/Respiratory: Uneventful            Sign Out: Acceptable/Baseline resp. status   CV/Hemodynamics: Uneventful            Sign Out: Acceptable CV status; No obvious hypovolemia; No obvious fluid overload   Other NRE: NONE   DID A NON-ROUTINE EVENT OCCUR? No           Last vitals:  Vitals Value Taken Time   /104 03/19/24 1042   Temp 36.1  C (97  F) 03/19/24 1027   Pulse     Resp 18 03/19/24 1042   SpO2 97 % 03/19/24 1042       Electronically Signed By: Mario Payton MD  March 19, 2024  11:13 AM

## 2024-03-19 NOTE — ANESTHESIA CARE TRANSFER NOTE
Patient: Brando Antony    Procedure: Procedure(s):  MORCELLATION, HYSTEROSCOPIC; PAP       Diagnosis: Thickened endometrium [R93.89]  Diagnosis Additional Information: No value filed.    Anesthesia Type:   MAC     Note:    Oropharynx: spontaneously breathing  Level of Consciousness: drowsy  Oxygen Supplementation: room air    Independent Airway: airway patency satisfactory and stable  Dentition: dentition unchanged  Vital Signs Stable: post-procedure vital signs reviewed and stable  Report to RN Given: handoff report given            Vitals:  Vitals Value Taken Time   /101    Temp 97.3    Pulse 91    Resp     SpO2 98        Electronically Signed By: CHEN Busch CRNA  March 19, 2024  10:28 AM

## 2024-03-19 NOTE — DISCHARGE INSTRUCTIONS
[facility]  Home Care Following Anesthesia  For 24 hours after surgery:  Get plenty of rest.  A responsible adult must stay with you for at least 24 hours after you leave the surgery center.  Do not drive or use heavy equipment.  If you have weakness or tingling, don't drive or use heavy equipment until this feeling goes away.   Do not drink alcohol.   Avoid strenuous or risky activities.  Ask for help when climbing stairs.  You may feel lightheaded.  IF so, sit for a few minutes before standing.  Have someone help you get up.   If you have nausea (feel sick to your stomach): Drink only clear liquids such as apple juice, ginger ale, broth or 7-Up.  Rest may also help.  Be sure to drink enough fluids.  Move to a regular diet as you feel able.   You may have a slight fever.  Call the doctor if your fever is over 100 F (37.7 C) (taken under the tongue) or lasts longer than 24 hours.  You may have a dry mouth, a sore throat, muscle aches or trouble sleeping. These should go away after 24 hours.  Do not make important or legal decisions.   It is recommended to avoid smoking.               Tips for taking pain medications  To get the best pain relief possible, remember these points:  Take pain medications as directed, before pain becomes severe.  Pain medication can upset your stomach: taking it with food may help.  Constipation is a common side effect of pain medication. Drink plenty of  fluids.  Eat foods high in fiber. Take a stool softener if recommended by your doctor or pharmacist.  Do not drink alcohol, drive or operate machinery while taking pain medications.  Ask about other ways to control pain, such as with heat, ice or relaxation.    Tylenol/Acetaminophen Consumption    If you feel your pain relief is insufficient, you may take Tylenol/Acetaminophen in addition to your narcotic pain medication.   Be careful not to exceed 4,000 mg of Tylenol/Acetaminophen in a 24 hour  period from all sources.  If you are taking extra strength Tylenol/acetaminophen (500 mg), the maximum dose is 8 tablets in 24 hours.  If you are taking regular strength acetaminophen (325 mg), the maximum dose is 12 tablets in 24 hours.    Call a doctor for any of the following:  Signs of infection (fever, growing tenderness at the surgery site, a large amount of drainage or bleeding, severe pain, foul-smelling drainage, redness, swelling).  It has been over 8 to 10 hours since surgery and you are still not able to urinate (pass water).  Headache for over 24 hours.  Numbness, tingling or weakness the day after surgery (if you had spinal anesthesia).  Signs of Covid-19 infection (temperature over 100 degrees, shortness of breath, cough, loss of taste/smell, generalized body aches, persistent headache, chills, sore throat, nausea/vomiting/diarrhea)  Your doctor is:       Dr. Chana Terrazas, OB/GYN: [phone]               Or dial [phone] and ask for the resident on call for:  OB/GYN  For emergency care, call the:  [facility]:  [phone] (TTY for hearing impaired: [phone])

## 2024-03-20 LAB
PATH REPORT.COMMENTS IMP SPEC: NORMAL
PATH REPORT.COMMENTS IMP SPEC: NORMAL
PATH REPORT.FINAL DX SPEC: NORMAL
PATH REPORT.GROSS SPEC: NORMAL
PATH REPORT.MICROSCOPIC SPEC OTHER STN: NORMAL
PATH REPORT.RELEVANT HX SPEC: NORMAL
PHOTO IMAGE: NORMAL

## 2024-03-21 LAB
BKR LAB AP GYN ADEQUACY: NORMAL
BKR LAB AP GYN INTERPRETATION: NORMAL
PATH REPORT.COMMENTS IMP SPEC: NORMAL
PATH REPORT.COMMENTS IMP SPEC: NORMAL

## 2024-04-15 ENCOUNTER — OFFICE VISIT (OUTPATIENT)
Dept: OBGYN | Facility: CLINIC | Age: 34
End: 2024-04-15
Payer: COMMERCIAL

## 2024-04-15 VITALS
SYSTOLIC BLOOD PRESSURE: 141 MMHG | DIASTOLIC BLOOD PRESSURE: 101 MMHG | RESPIRATION RATE: 16 BRPM | TEMPERATURE: 98.3 F | WEIGHT: 134 LBS | BODY MASS INDEX: 25.32 KG/M2 | HEART RATE: 75 BPM

## 2024-04-15 DIAGNOSIS — Z98.890 STATUS POST HYSTEROSCOPIC POLYPECTOMY: Primary | ICD-10-CM

## 2024-04-15 DIAGNOSIS — N92.6 IRREGULAR MENSES: ICD-10-CM

## 2024-04-15 DIAGNOSIS — E28.2 PCOS (POLYCYSTIC OVARIAN SYNDROME): ICD-10-CM

## 2024-04-15 PROCEDURE — 99214 OFFICE O/P EST MOD 30 MIN: CPT | Performed by: OBSTETRICS & GYNECOLOGY

## 2024-04-15 RX ORDER — LETROZOLE 2.5 MG/1
2.5 TABLET, FILM COATED ORAL DAILY
Qty: 5 TABLET | Refills: 0 | Status: SHIPPED | OUTPATIENT
Start: 2024-04-15

## 2024-04-15 RX ORDER — MEDROXYPROGESTERONE ACETATE 10 MG
10 TABLET ORAL DAILY
Qty: 10 TABLET | Refills: 0 | Status: SHIPPED | OUTPATIENT
Start: 2024-04-15 | End: 2024-07-29

## 2024-04-15 NOTE — PROGRESS NOTES
"  Assessment & Plan     Status post hysteroscopic polypectomy  Doing well.   Reviewed pathology - benign.   Pap, HPV neg.    PCOS (polycystic ovarian syndrome)  - Discussed ovulation induction with clomid versus letrozole  - Discussed lower risk of multiples based on preliminary data with letrozole  - Discussed off label use of letrozole, versus FDA approval for clomid. Discussed some patients with PCOS have improved fertility rates with letrozole, others with clomid. Discussed anti-estrogenic effect of clomid. Given thin endometrial stripe on prior ultrasound, letrozole may be better option  - Discussed side effects of letrozole, primarily headache, dizziness, fatigue.   - Reviewed written letrozole instructions, will communicate by [place].  -  had semen analysis and plan HSG with first cycle.  - Will start ovulation induction cycle with spontaneous menses or provera.  - Taking PNV.    - medroxyPROGESTERone (PROVERA) 10 MG tablet; Take 1 tablet (10 mg) by mouth daily  - letrozole (FEMARA) 2.5 MG tablet; Take 1 tablet (2.5 mg) by mouth daily  - Progesterone; Standing    Irregular menses  Discussed HSG scheduling, procedure.   - XR Hysterosalpingogram; Future    Ordering of each unique test  Prescription drug management        BMI  Estimated body mass index is 25.32 kg/m  as calculated from the following:    Height as of 3/19/24: 1.549 m (5' 1\").    Weight as of this encounter: 60.8 kg (134 lb).             No follow-ups on file.    Humberto Michaels is a 33 year old, presenting for the following health issues:  Post-op Visit    HPI   Presents for follow-up of hysteroscopic polypectomy on 3/19/24.   No period since then, but had one right before procedure.   Recovering well, though some uterine cramping still.     Eager to start trying to conceive now.   Diagnosed with PCOS.   Irregular menses.    had normal semen analysis a few years ago.         Past Medical History:   Diagnosis Date    PCOS " (polycystic ovarian syndrome)        Past Surgical History:   Procedure Laterality Date    DILATION AND CURETTAGE, OPERATIVE HYSTEROSCOPY WITH MORCELLATOR, COMBINED N/A 3/19/2024    Procedure: MORCELLATION, HYSTEROSCOPIC; PAP;  Surgeon: Chana Terrazas MD;  Location: [facility] OR    WISDOM TOOTH EXTRACTION         Family History   Problem Relation Age of Onset    Lymphoma Mother     Hypertension Father     Diabetes Paternal Grandmother        Social History     Socioeconomic History    Marital status: Single     Spouse name: Not on file    Number of children: Not on file    Years of education: Not on file    Highest education level: Not on file   Occupational History    Not on file   Tobacco Use    Smoking status: Never     Passive exposure: Never    Smokeless tobacco: Never   Vaping Use    Vaping status: Never Used   Substance and Sexual Activity    Alcohol use: Yes     Comment: occasional    Drug use: Never    Sexual activity: Yes     Partners: Male     Birth control/protection: None   Other Topics Concern    Not on file   Social History Narrative    Not on file     Social Determinants of Health     Financial Resource Strain: Not on file   Food Insecurity: Not on file   Transportation Needs: Not on file   Physical Activity: Not on file   Stress: Not on file   Social Connections: Not on file   Interpersonal Safety: Low Risk  (2/23/2024)    Interpersonal Safety     Do you feel physically and emotionally safe where you currently live?: Yes     Within the past 12 months, have you been hit, slapped, kicked or otherwise physically hurt by someone?: No     Within the past 12 months, have you been humiliated or emotionally abused in other ways by your partner or ex-partner?: No   Housing Stability: Not on file       Current Outpatient Medications   Medication Sig Dispense Refill    vitamin D3 (CHOLECALCIFEROL) 50 mcg (2000 units) tablet Take 1 tablet (50 mcg) by mouth daily 90 tablet 3    acetaminophen (TYLENOL) 325  "MG tablet Take 3 tablets (975 mg) by mouth every 6 hours as needed for mild pain (Patient not taking: Reported on 4/15/2024) 90 tablet 0    ibuprofen (ADVIL/MOTRIN) 800 MG tablet Take 1 tablet (800 mg) by mouth every 6 hours as needed for other (mild and/or inflammatory pain) (Patient not taking: Reported on 4/15/2024) 90 tablet 0     No current facility-administered medications for this visit.        No Known Allergies        Review of Systems  Constitutional, HEENT, cardiovascular, pulmonary, gi and gu systems are negative, except as otherwise noted.      Objective    BP (!) 141/101 (BP Location: Right arm, Patient Position: Sitting, Cuff Size: Adult Regular)   Pulse 75   Temp 98.3  F (36.8  C)   Resp 16   Wt 60.8 kg (134 lb)   LMP 03/01/2024 (Exact Date)   BMI 25.32 kg/m    Body mass index is 25.32 kg/m .  Physical Exam   GENERAL: alert and no distress  MS: no gross musculoskeletal defects noted, no edema  PSYCH: mentation appears normal, affect normal/bright      Specimens  A Pap Imaged Thinlayer Screening, Cervix, Pap Smear collected at 0948  .  Interpretation  Negative for Intraepithelial Lesion or Malignancy (NILM)    Component      Latest Ref Rng 3/19/2024  9:28 AM   Other HR HPV      Negative  Negative    HPV 16 DNA      Negative  Negative    HPV 18 DNA      Negative  Negative      Specimens  A Endometrium, Endometrial polyps  .  .  Final Diagnosis  Endometrium, \"polyps\", hysteroscopic resection:  - Fragments of endometrial polyp(s)  - Disordered proliferative endometrium  - Fragments of unremarkable myometrium  - Negative for atypia or malignancy  Electronically signed by Radha Torres MD on 3/20/2024 at 4:40 PM        Signed Electronically by: Chana Terrazas MD    "

## 2024-04-15 NOTE — PATIENT INSTRUCTIONS
"Instructions for Letrozole    1. Have a period  - Take a pregnancy test, and report results to Dr. Terrazas. She will then order letrozole.  - Provera for 10 days (if you are not having periods on your own)  - You will get a period after the provera  - Your first of bleeding (real bleeding, not just spotting) is \"Day One\"  - On the first cycle only, call on Day One of the period to schedule the HSG (X-ray of fallopian tubes). Call [facility] at . This will be done on days 7-10.     2. Ovulate  - Take letrozole on days 3-7 (day 1 is first day of bleeding)    3. Check for ovulation  - Start day 10 for 10-14 days. Ovulation predictor kits helps us know when in cycle you ovulate. Inform Dr. Terrazas of when you had positive OPK.  - Come in to the lab for a blood draw on day 21 (progesterone), or when indicated by Dr. Terrazas based on OPK result.. If elevated, this confirms ovulation.    4. [place]  - Start day 11  - EVERY OTHER DAY to ensure a high concentration of sperm. DAILY for 2 days when ovulation kit is positive.    5. Check for pregnancy  - If you ovulated, you'll either get a period, or you'll be pregnant.  - If you don't get a period, take a pregnancy test two weeks after your progesterone level (day 35)    Send me a [employer] message or call with any questions.      "

## 2024-04-24 ENCOUNTER — TELEPHONE (OUTPATIENT)
Dept: OBGYN | Facility: CLINIC | Age: 34
End: 2024-04-24
Payer: COMMERCIAL

## 2024-04-24 DIAGNOSIS — E28.2 PCOS (POLYCYSTIC OVARIAN SYNDROME): Primary | ICD-10-CM

## 2024-04-24 NOTE — TELEPHONE ENCOUNTER
HSG scheduled at [facility]  Date and time of HS24 2PM  Lab time: 1PM  Performing Provider: Lali Nolan  LMP Date: 24   UPT ordered: Yes  [employer] Message Sent (passcode): Yes  Date: 24     Jacquelin  She/her/hers  [facility]

## 2024-04-24 NOTE — TELEPHONE ENCOUNTER
Patient called and LVM at 9:15AM.     LVMTCB to discuss HSG scheduling.       Jacquelin  She/her/hers  [facility]

## 2024-04-30 ENCOUNTER — HOSPITAL ENCOUNTER (OUTPATIENT)
Dept: GENERAL RADIOLOGY | Facility: CLINIC | Age: 34
Discharge: HOME | End: 2024-04-30
Attending: OBSTETRICS & GYNECOLOGY
Payer: COMMERCIAL

## 2024-04-30 ENCOUNTER — APPOINTMENT (OUTPATIENT)
Dept: LAB | Facility: CLINIC | Age: 34
End: 2024-04-30
Attending: OBSTETRICS & GYNECOLOGY
Payer: COMMERCIAL

## 2024-04-30 ENCOUNTER — LAB (OUTPATIENT)
Dept: LAB | Facility: CLINIC | Age: 34
End: 2024-04-30
Payer: COMMERCIAL

## 2024-04-30 DIAGNOSIS — N92.6 IRREGULAR MENSES: ICD-10-CM

## 2024-04-30 DIAGNOSIS — E28.2 PCOS (POLYCYSTIC OVARIAN SYNDROME): ICD-10-CM

## 2024-04-30 LAB — HCG UR QL: NEGATIVE

## 2024-04-30 PROCEDURE — 74740 X-RAY FEMALE GENITAL TRACT: CPT | Mod: 26 | Performed by: RADIOLOGY

## 2024-04-30 PROCEDURE — 255N000002 HC RX 255 OP 636: Performed by: OBSTETRICS & GYNECOLOGY

## 2024-04-30 PROCEDURE — 58340 CATHETER FOR HYSTEROGRAPHY: CPT

## 2024-04-30 PROCEDURE — 58340 CATHETER FOR HYSTEROGRAPHY: CPT | Performed by: OBSTETRICS & GYNECOLOGY

## 2024-04-30 PROCEDURE — 250N000009 HC RX 250

## 2024-04-30 PROCEDURE — 81025 URINE PREGNANCY TEST: CPT

## 2024-04-30 RX ORDER — IOPAMIDOL 510 MG/ML
10 INJECTION, SOLUTION INTRAVASCULAR ONCE
Status: COMPLETED | OUTPATIENT
Start: 2024-04-30 | End: 2024-04-30

## 2024-04-30 RX ORDER — LIDOCAINE HYDROCHLORIDE 10 MG/ML
INJECTION, SOLUTION EPIDURAL; INFILTRATION; INTRACAUDAL; PERINEURAL
Status: COMPLETED
Start: 2024-04-30 | End: 2024-04-30

## 2024-04-30 RX ADMIN — IOPAMIDOL 10 ML: 510 INJECTION, SOLUTION INTRAVASCULAR at 14:54

## 2024-04-30 RX ADMIN — LIDOCAINE HYDROCHLORIDE 50 MG: 10 INJECTION, SOLUTION EPIDURAL; INFILTRATION; INTRACAUDAL; PERINEURAL at 14:55

## 2024-04-30 NOTE — PROCEDURES
The patient is seen today for a hysterosalpingogram to discern tubal patency.     Dx:  PCOS, infertility    Procedure:  After verbal informed consent was obtained, they patient was placed in dorsal lithotomy on the fluoroscopy table. A speculum was placed in the vagina and the vagina and cervix were prepped with chlorhexidine. A tenaculum was placed on the anterior lip of the cervix.  Paracervical block was placed using a total of 5cc of 1% lidocaine.  A [employer] (PT[employer]) cannula was placed into the cervical os and the speculum was removed.    Under fluoroscopy, the Isovue dye was injected.    The radiologic findings will be documented by the interpreting radiologist.    The cannula and tenaculum were removed.    The patient tolerated the procedure well and was discharged to home.      Lali Nolan MD

## 2024-07-27 ENCOUNTER — HEALTH MAINTENANCE LETTER (OUTPATIENT)
Age: 34
End: 2024-07-27

## 2024-07-29 ENCOUNTER — TELEPHONE (OUTPATIENT)
Dept: OBGYN | Facility: CLINIC | Age: 34
End: 2024-07-29
Payer: COMMERCIAL

## 2024-07-29 DIAGNOSIS — E28.2 PCOS (POLYCYSTIC OVARIAN SYNDROME): ICD-10-CM

## 2024-07-29 RX ORDER — LETROZOLE 2.5 MG/1
2.5 TABLET, FILM COATED ORAL DAILY
Qty: 5 TABLET | Refills: 2 | Status: SHIPPED | OUTPATIENT
Start: 2024-07-29

## 2024-07-29 RX ORDER — MEDROXYPROGESTERONE ACETATE 10 MG
10 TABLET ORAL DAILY
Qty: 10 TABLET | Refills: 4 | Status: SHIPPED | OUTPATIENT
Start: 2024-07-29

## 2024-07-29 NOTE — TELEPHONE ENCOUNTER
Refill of provera and letrozole sent. She should take a pregnancy test before taking the provera.   Recommend patient do progesterone lab on day 21 to ensure she is ovulating with this dose.    Lali Carl MD

## 2024-07-29 NOTE — TELEPHONE ENCOUNTER
[facility] Call Center    Phone Message    May a detailed message be left on voicemail: yes     Reason for Call: Medication Refill Request    Has the patient contacted the pharmacy for the refill? Yes   Name of medication being requested: medroxyPROGESTERone (PROVERA) 10 MG tablet   Provider who prescribed the medication: JAUN  Pharmacy: [facility] [id number]    Date medication is needed: asap/ pt has appt in October but is needing refill of medication prior        Action Taken: Message routed to:  Other: RUBEN RIVAS    Travel Screening: Not Applicable     Date of Service:

## 2024-07-29 NOTE — TELEPHONE ENCOUNTER
Patient requesting refill on provera to induce periods.    Did one round of provera an letrozole.  TTC and wanting to refil Rx.    Please review and advise    Vidya Bolanos RN

## 2024-10-30 ENCOUNTER — OFFICE VISIT (OUTPATIENT)
Dept: OBGYN | Facility: CLINIC | Age: 34
End: 2024-10-30
Payer: COMMERCIAL

## 2024-10-30 VITALS
DIASTOLIC BLOOD PRESSURE: 102 MMHG | OXYGEN SATURATION: 100 % | BODY MASS INDEX: 25.38 KG/M2 | HEART RATE: 96 BPM | SYSTOLIC BLOOD PRESSURE: 144 MMHG | WEIGHT: 134.3 LBS

## 2024-10-30 DIAGNOSIS — N92.6 ABNORMAL MENSES: Primary | ICD-10-CM

## 2024-10-30 DIAGNOSIS — E28.2 PCOS (POLYCYSTIC OVARIAN SYNDROME): ICD-10-CM

## 2024-10-30 DIAGNOSIS — R03.0 ELEVATED BLOOD PRESSURE READING WITHOUT DIAGNOSIS OF HYPERTENSION: ICD-10-CM

## 2024-10-30 LAB — HCG UR QL: NEGATIVE

## 2024-10-30 PROCEDURE — 99214 OFFICE O/P EST MOD 30 MIN: CPT

## 2024-10-30 PROCEDURE — 81025 URINE PREGNANCY TEST: CPT

## 2024-10-30 RX ORDER — MEDROXYPROGESTERONE ACETATE 10 MG
10 TABLET ORAL DAILY
Qty: 10 TABLET | Refills: 3 | Status: SHIPPED | OUTPATIENT
Start: 2024-10-30

## 2024-10-30 RX ORDER — LETROZOLE 2.5 MG/1
TABLET, FILM COATED ORAL
Qty: 10 TABLET | Refills: 0 | Status: SHIPPED | OUTPATIENT
Start: 2024-10-30

## 2024-10-30 NOTE — PATIENT INSTRUCTIONS
"Instructions for Letrozole    1. Have a period  - Your first of bleeding (real bleeding, not just spotting) is \"Day One\"  - If you are not having periods on a regular basis on your own, take a pregnancy test, then take Provera for 10 days to get a period. You should get a period within 1 week of completing the Provera.    2. Letrozole  - Take letrozole step up dose(femara) on days 4-7 - follow directions on package(day 1 is first day of bleeding, not spotting)    3. Check for ovulation  - Ovulation predictor kits helps us know when in cycle you ovulate, start around day 10  - Come in to the lab for a blood draw on day 21 (progesterone). If elevated, this confirms ovulation.  - remember to schedule your \"lab only visit\"- call if having difficulty    4. [place]  - Start day 11  - EVERY OTHER DAY to ensure a high concentration of sperm    5. Check for pregnancy  - If you ovulated, you'll either get a period, or you'll be pregnant.  - If you don't get a period, take a pregnancy test two weeks after your progesterone level (day 35)    Send me a Zubie message or call with any questions.     Possible side effects of letrozole include fatigue, dizziness, nightsweats, hot flushes. Letrozole modestly increases the risk of multiples (twins, rarely triplets).   Usually I recommend 3-4 cycles of ovulation induction medication, but if no success after 3-4 cycles, I recommend seeing a reproductive endocrinologist and infertility specialist. I can provide a list of local clinics at your request.       Jessica Guadalupe [facility]  Professional Building  [address]  [place], [place] [address] [phone]  _______________________________________________________    Silvio Michaels,   Here is the information for the fertility clinics we discussed:     [facility]   [email or web address]     [place] location:  [address]" [facility]  [place] [address]  [phone]     [place] location:  [facility]  [address]  [place] [id number] [phone]  __________________________________________________    [facility]  [address]  [phone]  __________________________________________________    [facility]  [email or web address]    [place] Location:  [address], [address]  [place] [address]     [place] Location:  [address], [address]  [place] [address]  [phone]

## 2024-10-30 NOTE — PROGRESS NOTES
CC: PCOS, abnormal menses, ttc  S: Brando is a 33 yo  here today to discuss abnormal menses and ttc  -has had workup and dx with Dr. Terrazas and determined to have PCOS  -has completed provera/letrazole x2 this year  -had hysteroscopy and polypectomy 3/2024 with Dr. Terrazas  -had HSG 2024 with Dr. Terrazas  -has completed tvus  -utd on pap smear 3/2024 NILM HPV-  -partner has completed semen analysis- reports 6 yr ago- would like to complete new sample- however currently does not have insurance- will look into self pay/outside clinics   -has had 2 induced menses this year with provera, most recently in sept had spontaneous menses lasting 5 days and normal flow  -tracks cycles- using opk kit- does get + ovulation but it tends to be before or after fertile window compared to the tracking hany   -has not competed cd21 progesterone yet    O:BP (!) 144/102 (BP Location: Left arm, Patient Position: Sitting)   Pulse 96   Wt 60.9 kg (134 lb 4.8 oz)   LMP 2024 (Exact Date)   SpO2 100%   BMI 25.38 kg/m    Past Medical History:   Diagnosis Date    PCOS (polycystic ovarian syndrome)      Past Surgical History:   Procedure Laterality Date    DILATION AND CURETTAGE, OPERATIVE HYSTEROSCOPY WITH MORCELLATOR, COMBINED N/A 3/19/2024    Procedure: MORCELLATION, HYSTEROSCOPIC; PAP;  Surgeon: Chana Terrazas MD;  Location: [facility] OR    WISDOM TOOTH EXTRACTION       Current Outpatient Medications   Medication Sig Dispense Refill    letrozole (FEMARA) 2.5 MG tablet Take 1 tablet day 4, 2 tablets day 5, 3 tablets day 6 and 4 tablets on day 7 10 tablet 0    medroxyPROGESTERone (PROVERA) 10 MG tablet Take 1 tablet (10 mg) by mouth daily. 10 tablet 3    vitamin D3 (CHOLECALCIFEROL) 50 mcg (2000 units) tablet Take 1 tablet (50 mcg) by mouth daily 90 tablet 3     No current facility-administered medications for this visit.      No Known Allergies  Alert very pleasant well appearing female NAD  Bp elevated  Normal pulse      Component      Latest Ref Rn 6/22/2023  9:15 AM 6/28/2023  7:38 AM 6/29/2023  9:43 AM 3/18/2024  7:42 AM   Sodium      136 - 145 mmol/L 136       Potassium      3.4 - 5.3 mmol/L 4.6       Chloride      98 - 107 mmol/L 104       Carbon Dioxide (CO2)      22 - 29 mmol/L 25       Anion Gap      7 - 15 mmol/L 7       Urea Nitrogen      6.0 - 20.0 mg/dL 13.2       Creatinine      0.51 - 0.95 mg/dL 0.61       Calcium      8.6 - 10.0 mg/dL 9.2       Glucose      70 - 99 mg/dL 103 (H)       GFR Estimate      >60 mL/min/1.73m2 >90       WBC      4.0 - 11.0 10e3/uL    9.3    RBC Count      3.80 - 5.20 10e6/uL    5.20    Hemoglobin      11.7 - 15.7 g/dL    15.5    Hematocrit      35.0 - 47.0 %    46.2    MCV      78 - 100 fL    89    MCH      26.5 - 33.0 pg    29.8    MCHC      31.5 - 36.5 g/dL    33.5    RDW      10.0 - 15.0 %    11.5    Platelet Count      150 - 450 10e3/uL    200    Other HR HPV      Negative        HPV 16 DNA      Negative        HPV 18 DNA      Negative        Final Diagnosis       TSH      0.30 - 4.20 uIU/mL 1.96       Vitamin D Deficiency screening      20 - 75 ug/L 13 (L)       Anti-Mullerian Hormone      0.580 - 8.100 ng/mL  7.260      FSH      mIU/mL  6.0      Estradiol      pg/mL   22       Component      Latest Ref [facility] 3/19/2024  9:28 AM   Sodium      136 - 145 mmol/L    Potassium      3.4 - 5.3 mmol/L    Chloride      98 - 107 mmol/L    Carbon Dioxide (CO2)      22 - 29 mmol/L    Anion Gap      7 - 15 mmol/L    Urea Nitrogen      6.0 - 20.0 mg/dL    Creatinine      0.51 - 0.95 mg/dL    Calcium      8.6 - 10.0 mg/dL    Glucose      70 - 99 mg/dL    GFR Estimate      >60 mL/min/1.73m2    WBC      4.0 - 11.0 10e3/uL    RBC Count      3.80 - 5.20 10e6/uL    Hemoglobin      11.7 - 15.7 g/dL    Hematocrit      35.0 - 47.0 %    MCV      78 - 100 fL    MCH      26.5 - 33.0 pg    MCHC      31.5 - 36.5 g/dL    RDW      10.0 - 15.0 %    Platelet Count      150 - 450 10e3/uL    Other HR HPV      Negative   Negative    HPV 16 DNA      Negative  Negative    HPV 18 DNA      Negative  Negative    Final Diagnosis This result contains rich text formatting which cannot be displayed here.    TSH      0.30 - 4.20 uIU/mL    Vitamin D Deficiency screening      20 - 75 ug/L    Anti-Mullerian Hormone      0.580 - 8.100 ng/mL    FSH      mIU/mL    Estradiol      pg/mL       Legend:  (H) High  (L) Low  Narrative & Impression   XR HYSTEROSALPINGOGRAM OB/GYN PERFORMS CATH 4/30/2024 2:53 PM     CLINICAL HISTORY: Irregular menses     Fluoroscopy time: 0.2 minutes     FINDINGS: Fluoroscopy was provided to the GYN service during  hysterosalpingogram. The uterus and fallopian tubes are normal. There  is bilateral peritoneal spillage.                                                                      IMPRESSION: Patent fallopian tubes.     SOCORRO MCCLAIN MD         SYSTEM ID:  [id number]       Gynecological Ultrasound Report  Pelvic U/S - Transabdominal and Transvaginal   [facility]  Referring Provider: Chana Terrazas MD   Sonographer:  Khloe Smart RDMS  Indication: Bleeding/Menses- Amenorrhea Primary  LMP: No LMP recorded. (Menstrual status: Irregular Periods).  History: Has irregular cycles and has not had a cycle for a year.      Gynecological Ultrasonography:   Uterus: anteverted. Contour is smooth/regular.  Size: 8.13 x 4.38 x 3.19 cm  Endometrium: Thickness Total 8.36 mm     Right Ovary: 3.35 x 2.96 x 2.10 cm. Multiple small cysts on periphery  Left Ovary: 3.26 x 2.42 x 2.07 cm. Multiple small cysts on periphery  Cul de Sac Free Fluid: No free fluid  Technique: Transvaginal Imaging performed  Transabdominal Imaging performed     Impression:   Uterus is normal in size and contour.  Both ovaries have mulitple small follicles around the periphery, consistent with the appearance of polycystic ovaries.  Recommend clinical correlation.     Lali Nolan MD     A/P:  1. Abnormal  menses (Primary)  - HCG Qual, Urine ([id number]); Future  - Vitamin D Deficiency; Future  - Hemoglobin A1c; Future  - HCG Qual, Urine ([id number])    2. PCOS (polycystic ovarian syndrome)  -discussed risks benefits alt of letrazole- has completed 2x courses- recc step up dosing  -take upt prior to provera-   -continue cycle tracking-timed intercourse and opk  -come in for cd21 progesterone to confirm ovulation  -A1c recc for dm screening  -vit d- hx deficiency stopped supplement  -discussed typically after 3 courses provera -next step [facility]- info given for clinics  - medroxyPROGESTERone (PROVERA) 10 MG tablet; Take 1 tablet (10 mg) by mouth daily.  Dispense: 10 tablet; Refill: 3  - letrozole (FEMARA) 2.5 MG tablet; Take 1 tablet day 4, 2 tablets day 5, 3 tablets day 6 and 4 tablets on day 7  Dispense: 10 tablet; Refill: 0    3. Elevated blood pressure reading without diagnosis of hypertension  -see PCP- for htn work up discussed implications for htn for life as well as pregnancy- we recc normal bp entering into pregnancy- may consider medications  - Primary Care Referral; Future    Rtc for cd21 for progesterone A1c, vit d  Schedule Follow-up with Primary OB - Dr. Terrazas  -look into [facility] clinics  CHEN Irving CNP

## 2024-12-12 ENCOUNTER — LAB (OUTPATIENT)
Dept: LAB | Facility: CLINIC | Age: 34
End: 2024-12-12
Payer: COMMERCIAL

## 2024-12-12 DIAGNOSIS — E28.2 PCOS (POLYCYSTIC OVARIAN SYNDROME): ICD-10-CM

## 2024-12-12 DIAGNOSIS — N92.6 ABNORMAL MENSES: ICD-10-CM

## 2024-12-12 LAB
EST. AVERAGE GLUCOSE BLD GHB EST-MCNC: 105 MG/DL
HBA1C MFR BLD: 5.3 % (ref 0–5.6)
PROGEST SERPL-MCNC: 1.3 NG/ML
VIT D+METAB SERPL-MCNC: 25 NG/ML (ref 20–50)

## 2024-12-12 PROCEDURE — 84144 ASSAY OF PROGESTERONE: CPT

## 2024-12-12 PROCEDURE — 83036 HEMOGLOBIN GLYCOSYLATED A1C: CPT

## 2024-12-12 PROCEDURE — 82306 VITAMIN D 25 HYDROXY: CPT

## 2024-12-12 PROCEDURE — 36415 COLL VENOUS BLD VENIPUNCTURE: CPT

## 2024-12-29 ENCOUNTER — MYC REFILL (OUTPATIENT)
Dept: OBGYN | Facility: CLINIC | Age: 34
End: 2024-12-29
Payer: COMMERCIAL

## 2024-12-29 DIAGNOSIS — E28.2 PCOS (POLYCYSTIC OVARIAN SYNDROME): ICD-10-CM

## 2024-12-30 RX ORDER — MEDROXYPROGESTERONE ACETATE 10 MG
10 TABLET ORAL DAILY
Qty: 10 TABLET | Refills: 3 | OUTPATIENT
Start: 2024-12-30

## 2024-12-30 RX ORDER — LETROZOLE 2.5 MG/1
TABLET, FILM COATED ORAL
Qty: 10 TABLET | Refills: 0 | OUTPATIENT
Start: 2024-12-30

## 2024-12-30 NOTE — TELEPHONE ENCOUNTER
Requested Prescriptions   Pending Prescriptions Disp Refills    medroxyPROGESTERone (PROVERA) 10 MG tablet 10 tablet 3     Sig: Take 1 tablet (10 mg) by mouth daily.       There is no refill protocol information for this order       letrozole (FEMARA) 2.5 MG tablet 10 tablet 0     Sig: Take 1 tablet day 4, 2 tablets day 5, 3 tablets day 6 and 4 tablets on day 7       There is no refill protocol information for this order        Recommended pt start evisit per provider protocol to receive refills of fertility meds  Brina Molina RN on 12/30/2024 at 10:20 AM

## 2025-01-02 ENCOUNTER — TELEPHONE (OUTPATIENT)
Dept: OBGYN | Facility: CLINIC | Age: 35
End: 2025-01-02

## 2025-01-02 NOTE — TELEPHONE ENCOUNTER
Called Brando.  She self-scheduled with Raciel Aguilar today to discuss fertility concerns, but has been seen for this in the past on the 7th floor.  Per Raciel, this appointment was scheduled inappropriately.      Brando states she tried to schedule with 7th floor, but was able to get in sooner here with us.  I explained that she would be best served by being seen by the providers she has already established care with, but that we would be happy to schedule her with one of our OBGYNs if she is seeking to transfer her care.     Brando requested to be transferred to 7th floor to schedule.

## 2025-01-10 ENCOUNTER — LAB (OUTPATIENT)
Dept: LAB | Facility: CLINIC | Age: 35
End: 2025-01-10
Payer: COMMERCIAL

## 2025-01-10 DIAGNOSIS — E28.2 PCOS (POLYCYSTIC OVARIAN SYNDROME): ICD-10-CM

## 2025-01-10 PROCEDURE — 84144 ASSAY OF PROGESTERONE: CPT

## 2025-01-10 PROCEDURE — 36415 COLL VENOUS BLD VENIPUNCTURE: CPT

## 2025-01-11 LAB — PROGEST SERPL-MCNC: 0.1 NG/ML

## 2025-01-16 ENCOUNTER — LAB (OUTPATIENT)
Dept: LAB | Facility: CLINIC | Age: 35
End: 2025-01-16
Payer: MEDICAID

## 2025-01-16 ENCOUNTER — ALLIED HEALTH/NURSE VISIT (OUTPATIENT)
Dept: FAMILY MEDICINE | Facility: CLINIC | Age: 35
End: 2025-01-16
Payer: MEDICAID

## 2025-01-16 VITALS — DIASTOLIC BLOOD PRESSURE: 91 MMHG | HEART RATE: 86 BPM | SYSTOLIC BLOOD PRESSURE: 128 MMHG

## 2025-01-16 DIAGNOSIS — Z01.30 BP CHECK: Primary | ICD-10-CM

## 2025-01-16 DIAGNOSIS — E28.2 PCOS (POLYCYSTIC OVARIAN SYNDROME): ICD-10-CM

## 2025-01-16 NOTE — PROGRESS NOTES
"Follow Up Blood Pressure Check    Brando Antony is a 34 year old female recommended to follow up for blood pressure check by  self-referred . Anihypertensive medications and adherence were verified:  not taking antihypertensive medications .     Reason for visit: Follow up BP    Medication change at last visit: No    Today's Vitals:   Vitals:    01/16/25 1539 01/16/25 1545   BP: (!) 128/98 (!) 128/91   BP Location: Right arm Right arm   Patient Position: Sitting Sitting   Cuff Size: Adult Regular Adult Regular   Pulse: 86 86       Home blood pressure readings brought in today:   No    Lowest blood pressure today is <180/<110 and they deny signs or symptoms of new onset: severe headache, fatigue, confusion, vision changes, chest pain, pounding in the chest, neck, ears, irregular heartbeat, difficulty breathing, and blood in the urine.      Patient does not have a primary care provider. I see that her blood pressure was elevated at last OBGYN in person visit 10/30/24. At that time patient was instructed of the following:    \"3. Elevated blood pressure reading without diagnosis of hypertension  -see PCP- for htn work up discussed implications for htn for life as well as pregnancy- we recc normal bp entering into pregnancy- may consider medications  - Primary Care Referral; Future\"     Assisted patient with scheduling a visit to establish care with Jb Barton PA-C at our clinic in February.    Cleve Lauren RN    Current Outpatient Medications   Medication Sig Dispense Refill    letrozole (FEMARA) 2.5 MG tablet Take 1 tablet day 4, 2 tablets day 5, 3 tablets day 6 and 4 tablets on day 7 10 tablet 0    medroxyPROGESTERone (PROVERA) 10 MG tablet Take 1 tablet (10 mg) by mouth daily. 10 tablet 3    vitamin D3 (CHOLECALCIFEROL) 50 mcg (2000 units) tablet Take 1 tablet (50 mcg) by mouth daily 90 tablet 3     "

## 2025-01-20 ENCOUNTER — OFFICE VISIT (OUTPATIENT)
Dept: MIDWIFE SERVICES | Facility: CLINIC | Age: 35
End: 2025-01-20

## 2025-01-20 VITALS
DIASTOLIC BLOOD PRESSURE: 82 MMHG | BODY MASS INDEX: 25.51 KG/M2 | OXYGEN SATURATION: 95 % | SYSTOLIC BLOOD PRESSURE: 117 MMHG | HEART RATE: 98 BPM | WEIGHT: 135 LBS

## 2025-01-20 DIAGNOSIS — Z31.41 FERTILITY TESTING: ICD-10-CM

## 2025-01-20 DIAGNOSIS — E28.2 PCOS (POLYCYSTIC OVARIAN SYNDROME): ICD-10-CM

## 2025-01-20 DIAGNOSIS — N92.6 ABNORMAL MENSES: ICD-10-CM

## 2025-01-20 DIAGNOSIS — E28.2 PCOS (POLYCYSTIC OVARIAN SYNDROME): Primary | ICD-10-CM

## 2025-01-20 PROCEDURE — 99212 OFFICE O/P EST SF 10 MIN: CPT | Performed by: ADVANCED PRACTICE MIDWIFE

## 2025-01-20 PROCEDURE — G2211 COMPLEX E/M VISIT ADD ON: HCPCS | Performed by: ADVANCED PRACTICE MIDWIFE

## 2025-01-20 RX ORDER — MEDROXYPROGESTERONE ACETATE 10 MG
10 TABLET ORAL DAILY
Qty: 10 TABLET | Refills: 3 | Status: SHIPPED | OUTPATIENT
Start: 2025-01-20

## 2025-01-20 RX ORDER — LETROZOLE 2.5 MG/1
TABLET, FILM COATED ORAL
Qty: 10 TABLET | Refills: 0 | Status: SHIPPED | OUTPATIENT
Start: 2025-01-20

## 2025-01-20 NOTE — PROGRESS NOTES
Brando Antony presents to the clinic for follow up to her menstrual issues. She has PCOS and has been seeing our OB/GYN team for letrazole in hopes of conceiving. She reports her last period the end of December. In earlier January had a positive home ovulation predictor kit but negative  progesterone on cd21. Her plan was to try letrazole several times and is in need of refills for letrazole and provera. She also wants to understand next steps.     Chart reviewed and next steps are to follow up with Dr Terrazas on progress. Okay for refill for letrizole and provera (ally Guadalupe refilled today). Also referral to [facility].       O: /82   Pulse 98   Wt 61.2 kg (135 lb)   SpO2 95%   BMI 25.51 kg/m    General: well appearing, in NAD  Cardiac: well perfused  Respiratory: non-labored breathing on room air   Psych: alert and oriented     A/P:  (E28.2) PCOS (polycystic ovarian syndrome)  (primary encounter diagnosis)  Comment: make f/up appointment with Dr Terrazas for continued treatment  Plan:       (N92.6) Abnormal menses  Comment:   Plan: Continue letrazole and provera for another cycle and follow up with OB/GYN team for further treatment.  Gave information on fertility clinic options in [facility] for further evaluation.     Sara Parry, DORY, APRN CALEBM

## 2025-01-20 NOTE — PATIENT INSTRUCTIONS
Silvio Michaels,   Here is the information for the fertility clinics we discussed:     [place] [facility]   [email or web address]     [place] location:  [facility]  [address]  [place] [address] [phone]     [place] location:  [facility]  [address]  [place] [address] [phone]  __________________________________________________    [facility]  [address] [place]  [phone]  __________________________________________________    [facility]  [email or web address]    [place] Location:  [address], [address]  [place] [address]     [place] Location:  [address]  [place] [address] [phone]     Sara Parry CNM, CHEN CONNORS

## 2025-04-03 ENCOUNTER — LAB (OUTPATIENT)
Dept: LAB | Facility: CLINIC | Age: 35
End: 2025-04-03
Payer: COMMERCIAL

## 2025-04-03 DIAGNOSIS — E28.2 PCOS (POLYCYSTIC OVARIAN SYNDROME): ICD-10-CM

## 2025-04-14 ENCOUNTER — OFFICE VISIT (OUTPATIENT)
Dept: OBGYN | Facility: CLINIC | Age: 35
End: 2025-04-14
Payer: COMMERCIAL

## 2025-04-14 VITALS
HEART RATE: 76 BPM | SYSTOLIC BLOOD PRESSURE: 127 MMHG | DIASTOLIC BLOOD PRESSURE: 81 MMHG | WEIGHT: 134.9 LBS | BODY MASS INDEX: 25.49 KG/M2 | OXYGEN SATURATION: 100 %

## 2025-04-14 DIAGNOSIS — E28.2 PCOS (POLYCYSTIC OVARIAN SYNDROME): Primary | ICD-10-CM

## 2025-04-14 PROCEDURE — 99214 OFFICE O/P EST MOD 30 MIN: CPT | Performed by: OBSTETRICS & GYNECOLOGY

## 2025-04-14 PROCEDURE — 3074F SYST BP LT 130 MM HG: CPT | Performed by: OBSTETRICS & GYNECOLOGY

## 2025-04-14 PROCEDURE — 3079F DIAST BP 80-89 MM HG: CPT | Performed by: OBSTETRICS & GYNECOLOGY

## 2025-04-14 RX ORDER — LETROZOLE 2.5 MG/1
7.5 TABLET, FILM COATED ORAL DAILY
Qty: 15 TABLET | Refills: 0 | Status: SHIPPED | OUTPATIENT
Start: 2025-04-14

## 2025-04-14 RX ORDER — MEDROXYPROGESTERONE ACETATE 10 MG
10 TABLET ORAL DAILY
Qty: 10 TABLET | Refills: 4 | Status: SHIPPED | OUTPATIENT
Start: 2025-04-14

## 2025-04-14 NOTE — PROGRESS NOTES
Assessment & Plan     PCOS (polycystic ovarian syndrome)  Discussed that day 21 progesterone has not shown ovulation.   Offered one more cycle of letrozole at 7.5 mg daily  If no ovulation then trial of clomid.   Discussed evaluation in [facility] clinic at any time or if no ovulation with clomid. Will send clinic information for her review.   - letrozole (FEMARA) 2.5 MG tablet; Take 3 tablets (7.5 mg) by mouth daily.  - medroxyPROGESTERone (PROVERA) 10 MG tablet; Take 1 tablet (10 mg) by mouth daily.                No follow-ups on file.    Subjective   Brando is a 34 year old, presenting for the following health issues:  Fertility (consult)    HPI     Presents for discussion of PCOS and ovulation induction.   Has done four cycles of letrozole.    4/15/24: letrozole 2.5 mg  7/29/24: letrozole 2.5 mg  10/30/24: letrozole step up protocol  1/20/25: letrozole step up protocol      Progesterone   Latest Ref Rng ng/mL   12/12/2024  3:55 PM 1.3    1/10/2025  3:16 PM 0.1    1/16/2025  3:37 PM 0.2    4/3/2025  3:00 PM <0.1            Past Medical History:   Diagnosis Date    PCOS (polycystic ovarian syndrome)        Past Surgical History:   Procedure Laterality Date    DILATION AND CURETTAGE, OPERATIVE HYSTEROSCOPY WITH MORCELLATOR, COMBINED N/A 3/19/2024    Procedure: MORCELLATION, HYSTEROSCOPIC; PAP;  Surgeon: Chana Terrazas MD;  Location: [facility] OR    WISDOM TOOTH EXTRACTION         Family History   Problem Relation Age of Onset    Lymphoma Mother     Hypertension Father     Diabetes Paternal Grandmother        Social History     Socioeconomic History    Marital status: Single     Spouse name: Not on file    Number of children: Not on file    Years of education: Not on file    Highest education level: Not on file   Occupational History    Not on file   Tobacco Use    Smoking status: Never     Passive exposure: Never    Smokeless tobacco: Never   Vaping Use    Vaping status: Never Used   Substance and Sexual Activity     Alcohol use: Yes     Comment: occasional    Drug use: Never    Sexual activity: Yes     Partners: Male     Birth control/protection: None   Other Topics Concern    Not on file   Social History Narrative    Not on file     Social Drivers of Health     Financial Resource Strain: Not on file   Food Insecurity: Not on file   Transportation Needs: Not on file   Physical Activity: Not on file   Stress: Not on file   Social Connections: Not on file   Interpersonal Safety: Low Risk  (2/23/2024)    Interpersonal Safety     Do you feel physically and emotionally safe where you currently live?: Yes     Within the past 12 months, have you been hit, slapped, kicked or otherwise physically hurt by someone?: No     Within the past 12 months, have you been humiliated or emotionally abused in other ways by your partner or ex-partner?: No   Housing Stability: Not on file       Current Outpatient Medications   Medication Sig Dispense Refill    letrozole (FEMARA) 2.5 MG tablet Take 1 tablet day 4, 2 tablets day 5, 3 tablets day 6 and 4 tablets on day 7 10 tablet 0    medroxyPROGESTERone (PROVERA) 10 MG tablet Take 1 tablet (10 mg) by mouth daily. 10 tablet 3    vitamin D3 (CHOLECALCIFEROL) 50 mcg (2000 units) tablet Take 1 tablet (50 mcg) by mouth daily 90 tablet 3     No current facility-administered medications for this visit.        No Known Allergies        Review of Systems  Constitutional, HEENT, cardiovascular, pulmonary, gi and gu systems are negative, except as otherwise noted.      Objective    /81 (BP Location: Left arm, Patient Position: Sitting, Cuff Size: Adult Small)   Pulse 76   Wt 61.2 kg (134 lb 14.4 oz)   LMP 03/14/2025 (Exact Date)   SpO2 100%   BMI 25.49 kg/m    Body mass index is 25.49 kg/m .  Physical Exam   GENERAL: alert and no distress  MS: no gross musculoskeletal defects noted, no edema  PSYCH: mentation appears normal, affect normal/bright            Signed Electronically by: Chana Lopez  MD Mk

## 2025-05-27 ENCOUNTER — MYC MEDICAL ADVICE (OUTPATIENT)
Dept: OBGYN | Facility: CLINIC | Age: 35
End: 2025-05-27
Payer: COMMERCIAL

## 2025-05-28 ENCOUNTER — E-VISIT (OUTPATIENT)
Dept: OBGYN | Facility: CLINIC | Age: 35
End: 2025-05-28
Payer: COMMERCIAL

## 2025-05-28 DIAGNOSIS — E28.2 PCOS (POLYCYSTIC OVARIAN SYNDROME): Primary | ICD-10-CM

## 2025-05-28 PROCEDURE — 99207 PR NON-BILLABLE SERV PER CHARTING: CPT | Performed by: OBSTETRICS & GYNECOLOGY

## 2025-05-28 RX ORDER — CLOMIPHENE CITRATE 50 MG/1
TABLET ORAL
Qty: 5 TABLET | Refills: 0 | Status: SHIPPED | OUTPATIENT
Start: 2025-05-28

## 2025-08-10 ENCOUNTER — HEALTH MAINTENANCE LETTER (OUTPATIENT)
Age: 35
End: 2025-08-10

## (undated) DEVICE — SEAL SET MYOSURE ROD LENS SCOPE SINGLE USE 40-902

## (undated) DEVICE — COVER CAMERA IN-LIGHT DISP LT-C02

## (undated) DEVICE — PREP CHLORHEXIDINE 4% 4OZ (HIBICLENS) 57504

## (undated) DEVICE — Device

## (undated) DEVICE — BRUSH CYTOLOGY MULTILINGUAL ROVERS CERVEX 491461

## (undated) DEVICE — KIT PROCEDURE FLUENT IN/OUT FLOWPAK TISS TRAP FLT-112S

## (undated) DEVICE — ENDO ESU MORCELLATOR MYOSURE HANDPIECE MX0100

## (undated) DEVICE — GLOVE BIOGEL PI MICRO SZ 6.5 48565

## (undated) DEVICE — STRAP KNEE/BODY 31143004

## (undated) DEVICE — SOL NACL 0.9% IRRIG 500ML BOTTLE 2F7123

## (undated) DEVICE — SYR 10ML FINGER CONTROL W/O NDL 309695

## (undated) DEVICE — NDL SPINAL 25GA 3" QUINCKE 405170

## (undated) DEVICE — LINEN TOWEL PACK X5 5464

## (undated) DEVICE — SOL NACL 0.9% IRRIG 3000ML BAG 2B7477

## (undated) DEVICE — KIT SUREPATH 10ML  COLLECTION VIAL 1305190

## (undated) DEVICE — SOLIDIFIER (USE FOR UP TO 1500CC) MSOLID1500

## (undated) DEVICE — SOL WATER IRRIG 500ML BOTTLE 2F7113

## (undated) DEVICE — PAD CHUX UNDERPAD 30X36" P3036C

## (undated) DEVICE — GLOVE BIOGEL PI MICRO INDICATOR UNDERGLOVE SZ 7.0 48970

## (undated) RX ORDER — OXYCODONE HYDROCHLORIDE 5 MG/1
TABLET ORAL
Status: DISPENSED
Start: 2024-03-19

## (undated) RX ORDER — LIDOCAINE HYDROCHLORIDE 10 MG/ML
INJECTION, SOLUTION EPIDURAL; INFILTRATION; INTRACAUDAL; PERINEURAL
Status: DISPENSED
Start: 2024-03-19

## (undated) RX ORDER — PROPOFOL 10 MG/ML
INJECTION, EMULSION INTRAVENOUS
Status: DISPENSED
Start: 2024-03-19

## (undated) RX ORDER — ONDANSETRON 2 MG/ML
INJECTION INTRAMUSCULAR; INTRAVENOUS
Status: DISPENSED
Start: 2024-03-19

## (undated) RX ORDER — ACETAMINOPHEN 325 MG/1
TABLET ORAL
Status: DISPENSED
Start: 2024-03-19

## (undated) RX ORDER — FENTANYL CITRATE 50 UG/ML
INJECTION, SOLUTION INTRAMUSCULAR; INTRAVENOUS
Status: DISPENSED
Start: 2024-03-19

## (undated) RX ORDER — GLYCOPYRROLATE 0.2 MG/ML
INJECTION INTRAMUSCULAR; INTRAVENOUS
Status: DISPENSED
Start: 2024-03-19